# Patient Record
Sex: FEMALE | Race: WHITE | Employment: OTHER | URBAN - METROPOLITAN AREA
[De-identification: names, ages, dates, MRNs, and addresses within clinical notes are randomized per-mention and may not be internally consistent; named-entity substitution may affect disease eponyms.]

---

## 2022-01-07 ENCOUNTER — NURSE NAVIGATOR (OUTPATIENT)
Dept: CASE MANAGEMENT | Age: 67
End: 2022-01-07

## 2022-01-07 DIAGNOSIS — C50.912 MALIGNANT NEOPLASM OF LEFT FEMALE BREAST, UNSPECIFIED ESTROGEN RECEPTOR STATUS, UNSPECIFIED SITE OF BREAST (HCC): Primary | ICD-10-CM

## 2022-01-07 NOTE — PROGRESS NOTES
[address]  Breast Navigator Encounter    Name:    Vilma Miller  Age:    78 y.o. Diagnosis:   LEFT breast cancer    Interdisciplinary Team:  Med-Onc:    Surg-Onc:    Dr. Ria Knight:    Plastics:    :     Nurse Navigator:  Ade Turk RN, BSN, [facility]      Encounter type:  []Patient Initiated  []Navigator Follow-up []Pre-op  []Post-op  []Check-in Prior to First Treatment []Treatment Modality Change  []Survivorship Transition [x]Other:  Initial Navigator Encounter    Narrative:     Called patient for initial navigator contact. I explained what happens at the first consultation - an exam by the physician, a possible ultrasound, then complete discussion of surgical options and other treatment that may be considered. I encouraged the patient to bring  someone with her to this appointment since there is a lot of information that will be covered. She will be bringing her . Discussed breast MRI as the next step in the surgical work-up, and she is willing to go ahead and get this scheduled prior to her appt next week. With the assistance of the imaging navigator, I was able to get the patient set up for her breast MRI on 1/10/2022 at 1:45 pm.  I asked her to take her envelope from [address] with her to the appointment so they can load the images for comparison. Went over the location for the [place]. She lives very close to [facility], and will plan to make most of her appointments there after the initial consult. Provided the patient with my contact information and discussed my role in her care. Will continue to follow patient throughout  the care continuum.               Ade Turk RN, BSN, [facility]  Oncology Breast Navigator     [address]  [address] [place] [address] 22.  W: [phone]  F: [phone]  [email or web address]  Good Help to Those in [facility]

## 2022-01-07 NOTE — PROGRESS NOTES
Order placed for BILATERAL breast MRI with and without contrast per verbal order of Dr. Jesus Tovar. She is scheduled for 1/10/2022.

## 2022-01-10 ENCOUNTER — HOSPITAL ENCOUNTER (OUTPATIENT)
Dept: MRI IMAGING | Age: 67
Discharge: HOME | End: 2022-01-10
Attending: SURGERY
Payer: COMMERCIAL

## 2022-01-10 ENCOUNTER — NURSE NAVIGATOR (OUTPATIENT)
Dept: CASE MANAGEMENT | Age: 67
End: 2022-01-10

## 2022-01-10 DIAGNOSIS — C50.912 MALIGNANT NEOPLASM OF LEFT FEMALE BREAST, UNSPECIFIED ESTROGEN RECEPTOR STATUS, UNSPECIFIED SITE OF BREAST (HCC): ICD-10-CM

## 2022-01-10 PROCEDURE — 74011250636 HC RX REV CODE- 250/636

## 2022-01-10 PROCEDURE — 74011000250 HC RX REV CODE- 250: Performed by: SURGERY

## 2022-01-10 PROCEDURE — C9087 MRI BREAST BI W WO CONT: HCPCS

## 2022-01-10 PROCEDURE — 77049 MRI BREAST C-+ W/CAD BI: CPT

## 2022-01-10 PROCEDURE — 74011000258 HC RX REV CODE- 258: Performed by: SURGERY

## 2022-01-10 PROCEDURE — A9577 INJ MULTIHANCE: HCPCS

## 2022-01-10 RX ORDER — SODIUM CHLORIDE 0.9 % (FLUSH) 0.9 %
10 SYRINGE (ML) INJECTION
Status: COMPLETED | OUTPATIENT
Start: 2022-01-10 | End: 2022-01-10

## 2022-01-10 RX ADMIN — SODIUM CHLORIDE, PRESERVATIVE FREE 10 ML: 5 INJECTION INTRAVENOUS at 15:02

## 2022-01-10 RX ADMIN — SODIUM CHLORIDE 100 ML: 900 INJECTION, SOLUTION INTRAVENOUS at 15:02

## 2022-01-10 RX ADMIN — GADOBENATE DIMEGLUMINE 15 ML: 529 INJECTION, SOLUTION INTRAVENOUS at 15:00

## 2022-01-10 NOTE — PROGRESS NOTES
[address]  Breast Navigator Encounter    Name:    Carmella Allan  Age:    78 y.o. Diagnosis:   LEFT breast cancer    Interdisciplinary Team:  Med-Onc:    Surg-Onc:    Dr. Barron Nearing:    Plastics:    :     Nurse Navigator:  Flako Sena RN, BSN, [facility]      Encounter type:  [x]Patient Initiated  []Navigator Follow-up []Pre-op  []Post-op  []Check-in Prior to First Treatment []Treatment Modality Change  []Survivorship Transition []Other:       Narrative:    Had questions about forms that she is completing for her upcoming visit which I answered. Had questions about her meds - should she write down her OTCs? That is all she takes. I told her that she should. She has her MRI this afternoon and will take her disc with her when she goes. ADDENDUM:  Called after her MRI to say that she left her disc at the imaging center. I told her that this was fine; she did not need to go back to pick it up.         Flako Sena RN, BSN, [facility]  Oncology Breast Navigator     [address]  [address] [place] [address] Anahi Thacker  22.  W: [phone]  F: [phone]  [email or web address]  Good Help to Those in [facility]

## 2022-01-12 ENCOUNTER — OFFICE VISIT (OUTPATIENT)
Dept: SURGERY | Age: 67
End: 2022-01-12
Payer: COMMERCIAL

## 2022-01-12 ENCOUNTER — TRANSCRIBE ORDER (OUTPATIENT)
Dept: REGISTRATION | Age: 67
End: 2022-01-12

## 2022-01-12 VITALS
WEIGHT: 160 LBS | SYSTOLIC BLOOD PRESSURE: 158 MMHG | HEART RATE: 82 BPM | DIASTOLIC BLOOD PRESSURE: 87 MMHG | BODY MASS INDEX: 28.35 KG/M2 | HEIGHT: 63 IN

## 2022-01-12 DIAGNOSIS — Z01.812 PRE-PROCEDURE LAB EXAM: Primary | ICD-10-CM

## 2022-01-12 DIAGNOSIS — Z17.0 MALIGNANT NEOPLASM OF UPPER-OUTER QUADRANT OF LEFT BREAST IN FEMALE, ESTROGEN RECEPTOR POSITIVE (HCC): Primary | ICD-10-CM

## 2022-01-12 DIAGNOSIS — C50.412 MALIGNANT NEOPLASM OF UPPER-OUTER QUADRANT OF LEFT BREAST IN FEMALE, ESTROGEN RECEPTOR POSITIVE (HCC): Primary | ICD-10-CM

## 2022-01-12 PROCEDURE — 99205 OFFICE O/P NEW HI 60 MIN: CPT | Performed by: SURGERY

## 2022-01-12 PROCEDURE — 76642 ULTRASOUND BREAST LIMITED: CPT | Performed by: SURGERY

## 2022-01-12 RX ORDER — MINERAL OIL
ENEMA (ML) RECTAL DAILY
COMMUNITY

## 2022-01-12 RX ORDER — BENZOCAINE .13; .15; .5; 2 G/100G; G/100G; G/100G; G/100G
2 GEL ORAL DAILY
COMMUNITY

## 2022-01-12 RX ORDER — FAMOTIDINE 10 MG/1
10 TABLET ORAL EVERY OTHER DAY
COMMUNITY

## 2022-01-12 NOTE — PROGRESS NOTES
HISTORY OF PRESENT ILLNESS  Steve Vaughn is a 78 y.o. female. HPI NEW patient consult referred by Dr. Kisha Shah for LEFT breast IDC. Mass was found by annual screening mammogram. Patient cannot palpate any lumps. She is not having any pain. Patient denies any skin or nipple changes. 21- US guided biopsy of LEFT breast, LEFT IDC, grd 2, ER %, FL %, HER2 equivocal, FISH pending, ki-67 20%    - History of right breast biopsy, benign    Family History: Brother, dx at 70, colon cancer,       Mammogram, 21, BIRADS 4C, 2.5 cm irregular mass with indistinct margins at 1:00    MRI Results (most recent):  Results from Hospital Encounter encounter on 01/10/22    MRI BREAST BI W WO CONT    Narrative  HISTORY:  69-year-old female with newly diagnosed invasive ductal carcinoma in  the left breast at 1:00. EXAM: BILATERAL BREAST MRI WITH AND WITHOUT CONTRAST. CONTRAST: 15 mL MultiHance. ANESTHESIA: None    PROCEDURE: Bilateral high resolution dynamic and morphologic enhanced breast MRI  was performed using a dedicated breast coil in the prone position. Pre- and  postcontrast axial and sagittal T1 and STIR images were performed. These were  post-processed using CAD kinetic analysis, digital subtraction, enhancement  curves and 2D and 3D reconstructions. COMPARISON:  Recent mammography and ultrasonography. Celestina Seth FINDINGS: Background parenchyma is mild to moderate. Background parenchymal  enhancement is mild. Left breast: At approximately 2:00, posterior depth, mass enhancement, with  significant dynamic enhancement and washout kinetics, measures 4.4 cm TRV, 3.1  cm AP and 2.3 cm CC. It contains a biopsy clip artifact centrally. It is  accompanied by a nearby dynamically enhancing oval mass measuring 1.2 x 0.7 x  0.7 cm, immediately caudad in the posterior nipple line.  Since it lies  approximately 3 mm inferomedial to the dominant index mass, combined  measurements are as follows: 4.4 cm TRV, 3.1 cm AP and 2.8 cm CC. A left  axillary lymph node is irregular in configuration with asymmetric cortical  thickening, measuring 2.5 x 1.5 x 3.4 cm. Right breast: No dominant abnormality of morphology or enhancement. No  lymphadenopathy. Impression  1. Left BI-RADS 6, known malignancy. Right BI-RADS 2, benign. 2. LEFT BREAST: Known invasive ductal carcinoma at approximately 2:00 posterior  depth, with a daughter nodule 3 mm away, and a combined measurement of 4.4 x 3.1  x 2.8 cm. Indeterminate left axillary lymph node. Correlate with clinical  examination, ultrasonography and consider sampling if appropriate. No  multicentric disease. Surgical treatment is planned. 3. RIGHT BREAST: No MRI sign of malignancy. No lymphadenopathy. 4. A summary portfolio has been created for reference and is available in PACS. A negative breast MRI examination has high sensitivity and moderate specificity,  and speaks strongly against invasive cancer down to a detection threshold of  4-5 mm, but may not detect some lower grade or in situ carcinomas. Therefore,  MRI should not be used to avoid an o/w indicated biopsy. Due to the prone  positioning for this exam, the described location of MR findings may differ from  other studies. Routine clinical and mammographic followup are recommended. When  available, pathology followup is appreciated. History reviewed. No pertinent past medical history. History reviewed. No pertinent surgical history.   Social History     Socioeconomic History    Marital status:      Spouse name: Not on file    Number of children: Not on file    Years of education: Not on file    Highest education level: Not on file   Occupational History    Not on file   Tobacco Use    Smoking status: Former Smoker     Quit date:      Years since quittin.0    Smokeless tobacco: Never Used   Substance and Sexual Activity    Alcohol use: Yes     Comment: nightly    Drug use: Never    Sexual activity: Not on file   Other Topics Concern    Not on file   Social History Narrative    Not on file     Social Determinants of Health     Financial Resource Strain:     Difficulty of Paying Living Expenses: Not on file   Food Insecurity:     Worried About Running Out of Food in the Last Year: Not on file    Gianna of Food in the Last Year: Not on file   Transportation Needs:     Lack of Transportation (Medical): Not on file    Lack of Transportation (Non-Medical): Not on file   Physical Activity:     Days of Exercise per Week: Not on file    Minutes of Exercise per Session: Not on file   Stress:     Feeling of Stress : Not on file   Social Connections:     Frequency of Communication with Friends and Family: Not on file    Frequency of Social Gatherings with Friends and Family: Not on file    Attends [employer] Services: Not on file    Active Member of [address] [place] [address] [employer] or Organizations: Not on file    Attends Club or Organization Meetings: Not on file    Marital Status: Not on file   Intimate Partner Violence:     Fear of Current or Ex-Partner: Not on file    Emotionally Abused: Not on file    Physically Abused: Not on file    Sexually Abused: Not on file   Housing Stability:     Unable to Pay for Housing in the Last Year: Not on file    Number of [place] in the Last Year: Not on file    Unstable Housing in the Last Year: Not on file     OB History    No obstetric history on file. Obstetric Comments   Menarche 14, LMP 53, # of children 2, age of 4st delivery 31, Hysterectomy/oophorectomy No/No, Breast bx Yes, history of breast feeding Yes, BCP Yes, Hormone therapy Yes             Current Outpatient Medications:     glucosam/chond-msm1/C/gill/bor (GLUCOSAMINE-CHOND-MSM COMPLEX PO), Glucosamine, Disp: , Rfl:     omega 3-dha-epa-fish oil 1,000 mg (250 mg-750 mg)/5 mL liqd, 1 capsule, Disp: , Rfl:     famotidine (PEPCID) 10 mg tablet, Take 10 mg by mouth every other day. , Disp: , Rfl:     budesonide (RHINOCORT AQUA) 32 mcg/actuation nasal spray, , Disp: , Rfl:     fexofenadine (ALLEGRA) 180 mg tablet, Take  by mouth daily. , Disp: , Rfl:   Allergies   Allergen Reactions    Latex Rash    Sulfa (Sulfonamide Antibiotics) Rash       Review of Systems   All other systems reviewed and are negative. Physical Exam  Vitals and nursing note reviewed. Chest:   Breasts: Breasts are symmetrical.      Right: No inverted nipple, mass, nipple discharge, skin change, tenderness, axillary adenopathy or supraclavicular adenopathy. Left: Mass present. No inverted nipple, nipple discharge, skin change, tenderness, axillary adenopathy or supraclavicular adenopathy. Comments: 3 cm mass left breast uoq   Lymphadenopathy:      Cervical: No cervical adenopathy. Upper Body:      Right upper body: No supraclavicular, axillary or pectoral adenopathy. Left upper body: No supraclavicular, axillary or pectoral adenopathy. BREAST ULTRASOUND, Preop planning  Indication:preop planning  left Breast 2:00 and left axilla   Technique: The area was scanned using a high-frequency linear-array near-field transducer  Findings: 3 cm  irregular mass with dropout, normal appearing axillary nodes of note, the enlarged node on mri has normal cortex and shape. Impression: Biopsy site visible with ultrasound  Disposition:  Will schedule lumpectomy with sentinel lymph node biopsy    ASSESSMENT and PLAN    ICD-10-CM ICD-9-CM    1. Malignant neoplasm of upper-outer quadrant of left breast in female, estrogen receptor positive ([facility] [address]  C50.412 174.4     Z17.0 V86.0    LEFT IDC, grd 2, ER %, MS %, HER2 equivocal, FISH pending, ki-67 1-10%  T2 N0  Axillary nodes normal on ultrasounds  I have reviewed the imaging and pathology with her and she was given copies of these reports.     90 minutes were spent face-to-face with the patient during this encounter and 90% of that time was spent on counseling and coordination of care. 1. Discussed lumpectomy and radiation vs mastectomy. Discussed reconstruction. 2. Discussed sentinel lymph node biopsy. 3. Discussed external beam radiation. 4. Discussed hormone therapy. 5. Discussed the possibility of chemotherapy. Will send mammaprint. She wants a left us guided lumpectomy, sln biopsy   Risks include bleeding, bruising, scar, infection, need for more surgery and lymphedema  Will schedule.

## 2022-01-12 NOTE — H&P (VIEW-ONLY)
HISTORY OF PRESENT ILLNESS  Omar Guardado is a 78 y.o. female. HPI NEW patient consult referred by Dr. Miguelangel Clifton for LEFT breast IDC. Mass was found by annual screening mammogram. Patient cannot palpate any lumps. She is not having any pain. Patient denies any skin or nipple changes. 21- US guided biopsy of LEFT breast, LEFT IDC, grd 2, ER %, [place] %, HER2 equivocal, FISH pending, ki-67 20%    - History of right breast biopsy, benign    Family History: Brother, dx at 70, colon cancer,       Mammogram, 21, BIRADS 4C, 2.5 cm irregular mass with indistinct margins at 1:00    MRI Results (most recent):  Results from Hospital Encounter encounter on 01/10/22    MRI BREAST BI W WO CONT    Narrative  HISTORY:  57-year-old female with newly diagnosed invasive ductal carcinoma in  the left breast at 1:00. EXAM: BILATERAL BREAST MRI WITH AND WITHOUT CONTRAST. CONTRAST: 15 mL MultiHance. ANESTHESIA: None    PROCEDURE: Bilateral high resolution dynamic and morphologic enhanced breast MRI  was performed using a dedicated breast coil in the prone position. Pre- and  postcontrast axial and sagittal T1 and STIR images were performed. These were  post-processed using CAD kinetic analysis, digital subtraction, enhancement  curves and 2D and 3D reconstructions. COMPARISON:  Recent mammography and ultrasonography. Brielle Krishnamurthy FINDINGS: Background parenchyma is mild to moderate. Background parenchymal  enhancement is mild. Left breast: At approximately 2:00, posterior depth, mass enhancement, with  significant dynamic enhancement and washout kinetics, measures 4.4 cm TRV, 3.1  cm AP and 2.3 cm CC. It contains a biopsy clip artifact centrally. It is  accompanied by a nearby dynamically enhancing oval mass measuring 1.2 x 0.7 x  0.7 cm, immediately caudad in the posterior nipple line.  Since it lies  approximately 3 mm inferomedial to the dominant index mass, combined  measurements are as follows: 4.4 cm TRV, 3.1 cm AP and 2.8 cm CC. A left  axillary lymph node is irregular in configuration with asymmetric cortical  thickening, measuring 2.5 x 1.5 x 3.4 cm. Right breast: No dominant abnormality of morphology or enhancement. No  lymphadenopathy. Impression  1. Left BI-RADS 6, known malignancy. Right BI-RADS 2, benign. 2. LEFT BREAST: Known invasive ductal carcinoma at approximately 2:00 posterior  depth, with a daughter nodule 3 mm away, and a combined measurement of 4.4 x 3.1  x 2.8 cm. Indeterminate left axillary lymph node. Correlate with clinical  examination, ultrasonography and consider sampling if appropriate. No  multicentric disease. Surgical treatment is planned. 3. RIGHT BREAST: No MRI sign of malignancy. No lymphadenopathy. 4. A summary portfolio has been created for reference and is available in PACS. A negative breast MRI examination has high sensitivity and moderate specificity,  and speaks strongly against invasive cancer down to a detection threshold of  4-5 mm, but may not detect some lower grade or in situ carcinomas. Therefore,  MRI should not be used to avoid an o/w indicated biopsy. Due to the prone  positioning for this exam, the described location of MR findings may differ from  other studies. Routine clinical and mammographic followup are recommended. When  available, pathology followup is appreciated. History reviewed. No pertinent past medical history. History reviewed. No pertinent surgical history.   Social History     Socioeconomic History    Marital status:      Spouse name: Not on file    Number of children: Not on file    Years of education: Not on file    Highest education level: Not on file   Occupational History    Not on file   Tobacco Use    Smoking status: Former Smoker     Quit date:      Years since quittin.0    Smokeless tobacco: Never Used   Substance and Sexual Activity    Alcohol use: Yes     Comment: nightly    Drug use: Never    Sexual activity: Not on file   Other Topics Concern    Not on file   Social History Narrative    Not on file     Social Determinants of Health     Financial Resource Strain:     Difficulty of Paying Living Expenses: Not on file   Food Insecurity:     Worried About Running Out of Food in the Last Year: Not on file    Gianna of Food in the Last Year: Not on file   Transportation Needs:     Lack of Transportation (Medical): Not on file    Lack of Transportation (Non-Medical): Not on file   Physical Activity:     Days of Exercise per Week: Not on file    Minutes of Exercise per Session: Not on file   Stress:     Feeling of Stress : Not on file   Social Connections:     Frequency of Communication with Friends and Family: Not on file    Frequency of Social Gatherings with Friends and Family: Not on file    Attends [employer] Services: Not on file    Active Member of [address] [place] [address] [employer] or Organizations: Not on file    Attends Club or Organization Meetings: Not on file    Marital Status: Not on file   Intimate Partner Violence:     Fear of Current or Ex-Partner: Not on file    Emotionally Abused: Not on file    Physically Abused: Not on file    Sexually Abused: Not on file   Housing Stability:     Unable to Pay for Housing in the Last Year: Not on file    Number of [place] in the Last Year: Not on file    Unstable Housing in the Last Year: Not on file     OB History    No obstetric history on file. Obstetric Comments   Menarche 14, LMP 53, # of children 2, age of 4st delivery 31, Hysterectomy/oophorectomy No/No, Breast bx Yes, history of breast feeding Yes, BCP Yes, Hormone therapy Yes             Current Outpatient Medications:     glucosam/chond-msm1/C/gill/bor (GLUCOSAMINE-CHOND-MSM COMPLEX PO), Glucosamine, Disp: , Rfl:     omega 3-dha-epa-fish oil 1,000 mg (250 mg-750 mg)/5 mL liqd, 1 capsule, Disp: , Rfl:     famotidine (PEPCID) 10 mg tablet, Take 10 mg by mouth every other day. , Disp: , Rfl:     budesonide (RHINOCORT AQUA) 32 mcg/actuation nasal spray, , Disp: , Rfl:     fexofenadine (ALLEGRA) 180 mg tablet, Take  by mouth daily. , Disp: , Rfl:   Allergies   Allergen Reactions    Latex Rash    Sulfa (Sulfonamide Antibiotics) Rash       Review of Systems   All other systems reviewed and are negative. Physical Exam  Vitals and nursing note reviewed. Chest:   Breasts: Breasts are symmetrical.      Right: No inverted nipple, mass, nipple discharge, skin change, tenderness, axillary adenopathy or supraclavicular adenopathy. Left: Mass present. No inverted nipple, nipple discharge, skin change, tenderness, axillary adenopathy or supraclavicular adenopathy. Comments: 3 cm mass left breast uoq   Lymphadenopathy:      Cervical: No cervical adenopathy. Upper Body:      Right upper body: No supraclavicular, axillary or pectoral adenopathy. Left upper body: No supraclavicular, axillary or pectoral adenopathy. BREAST ULTRASOUND, Preop planning  Indication:preop planning  left Breast 2:00 and left axilla   Technique: The area was scanned using a high-frequency linear-array near-field transducer  Findings: 3 cm  irregular mass with dropout, normal appearing axillary nodes of note, the enlarged node on mri has normal cortex and shape. Impression: Biopsy site visible with ultrasound  Disposition:  Will schedule lumpectomy with sentinel lymph node biopsy    ASSESSMENT and PLAN    ICD-10-CM ICD-9-CM    1. Malignant neoplasm of upper-outer quadrant of left breast in female, estrogen receptor positive ([facility] [address]  C50.412 174.4     Z17.0 V86.0    LEFT IDC, grd 2, ER %, [place] %, HER2 equivocal, FISH pending, ki-67 1-10%  T2 N0  Axillary nodes normal on ultrasounds  I have reviewed the imaging and pathology with her and she was given copies of these reports.     90 minutes were spent face-to-face with the patient during this encounter and 90% of that time was spent on counseling and coordination of care. 1. Discussed lumpectomy and radiation vs mastectomy. Discussed reconstruction. 2. Discussed sentinel lymph node biopsy. 3. Discussed external beam radiation. 4. Discussed hormone therapy. 5. Discussed the possibility of chemotherapy. Will send mammaprint. She wants a left us guided lumpectomy, sln biopsy   Risks include bleeding, bruising, scar, infection, need for more surgery and lymphedema  Will schedule.

## 2022-01-13 ENCOUNTER — DOCUMENTATION ONLY (OUTPATIENT)
Dept: SURGERY | Age: 67
End: 2022-01-13

## 2022-01-13 NOTE — PROGRESS NOTES
Patient called requesting a letter to cancer her trip because of upcoming surgery. Please email letter to [email or web address]

## 2022-01-13 NOTE — PROGRESS NOTES
The patient called requesting a letter be written to excuse her from her trip the end of January. The patient is scheduled for a lumpectomy at the end of January. The letter was written and its in the chart.  I am waiting for the patient to call back with an email address to send it to?

## 2022-01-14 ENCOUNTER — DOCUMENTATION ONLY (OUTPATIENT)
Dept: CASE MANAGEMENT | Age: 67
End: 2022-01-14

## 2022-01-14 ENCOUNTER — NURSE NAVIGATOR (OUTPATIENT)
Dept: CASE MANAGEMENT | Age: 67
End: 2022-01-14

## 2022-01-14 NOTE — PROGRESS NOTES
[address]  Breast Navigator Encounter    Name:    Rina Carl  Age:    78 y.o. Diagnosis:    LEFT breast cancer    Interdisciplinary Team:  Med-Onc:    Surg-Onc:    Dr. Rian Jimenez:    Plastics:    :     Nurse Navigator:  Tanis Severs, RN, BSN, [facility]      Encounter type:  [x]Patient Initiated  []Navigator Follow-up []Pre-op  []Post-op  []Check-in Prior to First Treatment []Treatment Modality Change  []Survivorship Transition []Other:       Narrative:    Returned patient's call. She has been called by [facility] to get set up for PAT and COVID testing. She was not called yet by Dr. Robert Mckenzie surgical scheduler, so wanted to make sure that this was the correct information. I confirmed that she was scheduled for 1/25/2022 at [facility]. She will return the call to [facility] and get set up. We discussed sentinel node biopsy, how many people can come with her to surgery (1). She thinks that she remembers Dr. Ysabel Cotton saying that she will do a frozen section at the time of surgery. I will remind Dr. Ysabel Cotton of this. I told her that she was scheduled currently at 9:45 on the 25th, so SNBX will be approximately 2 hrs prior to this.           Tanis Severs, RN, BSN, [facility]  Oncology Breast Navigator     [address]  [address] [facility].  W: [phone]  F: [phone]  [email or web address]  Good Help to Those in [facility]

## 2022-01-14 NOTE — PROGRESS NOTES
Called [facility]. FISH not back yet. [facility] will check on this again this afternoon.

## 2022-01-18 DIAGNOSIS — C50.912 MALIGNANT NEOPLASM OF LEFT FEMALE BREAST, UNSPECIFIED ESTROGEN RECEPTOR STATUS, UNSPECIFIED SITE OF BREAST (HCC): Primary | ICD-10-CM

## 2022-01-18 NOTE — PROGRESS NOTES
I called and spoke with the patient about her upcoming surgery. We went over her morning of surgery time line, arriving there at 7:00 AM.  Having her SNBX AT 8:00 AM in nuclear medicine and surgery at 9:30 . She asked about a medication list she gave the nurse at her last visit. She would like a copy of this. She appreciated the phone call.

## 2022-01-20 ENCOUNTER — HOSPITAL ENCOUNTER (OUTPATIENT)
Dept: PREADMISSION TESTING | Age: 67
Discharge: HOME | End: 2022-01-20
Attending: SURGERY
Payer: COMMERCIAL

## 2022-01-20 ENCOUNTER — HOSPITAL ENCOUNTER (OUTPATIENT)
Dept: PREADMISSION TESTING | Age: 67
Discharge: HOME | End: 2022-01-20
Payer: COMMERCIAL

## 2022-01-20 VITALS
BODY MASS INDEX: 29.38 KG/M2 | WEIGHT: 165.79 LBS | SYSTOLIC BLOOD PRESSURE: 122 MMHG | DIASTOLIC BLOOD PRESSURE: 80 MMHG | OXYGEN SATURATION: 97 % | HEIGHT: 63 IN | HEART RATE: 77 BPM | TEMPERATURE: 98.3 F

## 2022-01-20 DIAGNOSIS — Z01.812 PRE-PROCEDURE LAB EXAM: ICD-10-CM

## 2022-01-20 LAB
BASOPHILS # BLD: 0 K/UL (ref 0–0.1)
BASOPHILS NFR BLD: 0 % (ref 0–1)
DIFFERENTIAL METHOD BLD: NORMAL
EOSINOPHIL # BLD: 0.1 K/UL (ref 0–0.4)
EOSINOPHIL NFR BLD: 2 % (ref 0–7)
ERYTHROCYTE [DISTWIDTH] IN BLOOD BY AUTOMATED COUNT: 12.3 % (ref 11.5–14.5)
HCT VFR BLD AUTO: 41.4 % (ref 35–47)
HGB BLD-MCNC: 13.9 G/DL (ref 11.5–16)
IMM GRANULOCYTES # BLD AUTO: 0 K/UL (ref 0–0.04)
IMM GRANULOCYTES NFR BLD AUTO: 0 % (ref 0–0.5)
LYMPHOCYTES # BLD: 3.3 K/UL (ref 0.8–3.5)
LYMPHOCYTES NFR BLD: 43 % (ref 12–49)
MCH RBC QN AUTO: 32 PG (ref 26–34)
MCHC RBC AUTO-ENTMCNC: 33.6 G/DL (ref 30–36.5)
MCV RBC AUTO: 95.4 FL (ref 80–99)
MONOCYTES # BLD: 0.6 K/UL (ref 0–1)
MONOCYTES NFR BLD: 9 % (ref 5–13)
NEUTS SEG # BLD: 3.5 K/UL (ref 1.8–8)
NEUTS SEG NFR BLD: 46 % (ref 32–75)
NRBC # BLD: 0 K/UL (ref 0–0.01)
NRBC BLD-RTO: 0 PER 100 WBC
PLATELET # BLD AUTO: 217 K/UL (ref 150–400)
PMV BLD AUTO: 10.6 FL (ref 8.9–12.9)
RBC # BLD AUTO: 4.34 M/UL (ref 3.8–5.2)
WBC # BLD AUTO: 7.6 K/UL (ref 3.6–11)

## 2022-01-20 PROCEDURE — 36415 COLL VENOUS BLD VENIPUNCTURE: CPT

## 2022-01-20 PROCEDURE — 85025 COMPLETE CBC W/AUTO DIFF WBC: CPT

## 2022-01-20 PROCEDURE — U0005 INFEC AGEN DETEC AMPLI PROBE: HCPCS

## 2022-01-20 RX ORDER — LORAZEPAM 1 MG/1
1 TABLET ORAL AS NEEDED
COMMUNITY

## 2022-01-20 NOTE — PERIOP NOTES
[address] INSTRUCTIONS    Surgery Date:   1/25/22    [facility] staff will call you between 4 PM- 8 PM the day before surgery with your arrival time. If your surgery is on a Monday, we will call you the preceding Friday. Please call [phone] after 8 PM if you did not receive your arrival time. 1. Please report to [facility] Patient Access/Admitting on the 1st floor. Bring your insurance card, photo identification, and any copayment ( if applicable). 2. If you are going home the same day of your surgery, you must have a responsible adult to drive you home. You need to have a responsible adult to stay with you the first 24 hours after surgery and you should not drive a car for 24 hours following your surgery. 3. Do NOT eat any solid foods after midnight the night before surgery including candy, mint or gum. You may drink clear liquids from midnight until 1 hour prior to your arrival. You may drink up to 12 ounces at one time every 4 hours. Please note special instructions, if applicable, below for medications. 4. Do NOT drink alcohol or smoke 24 hours before surgery. STOP smoking for 14 days prior as it helps with breathing and healing after surgery. 5. If you are being admitted to the hospital, please leave personal belongings/luggage in your car until you have an assigned hospital room number. 6. Please wear comfortable clothes. Wear your glasses instead of contacts. We ask that all money, jewelry and valuables be left at home. Wear no make up, particularly mascara, the day of surgery. 7.  All body piercings, rings, and jewelry need to be removed and left at home. Please remove any nail polish or artifical nails from your fingernails. Please wear your hair loose or down. Please no pony-tails, buns, or any metal hair accessories. If you shower the morning of surgery, please do not apply any lotions or powders afterwards. You may wear deodorant, unless having breast surgery.   Do not shave any body area within 24 hours of your surgery. 8. Please follow all instructions to avoid any potential surgical cancellation. 9. Should your physical condition change, (i.e. fever, cold, flu, etc.) please notify your surgeon as soon as possible. 10. It is important to be on time. If a situation occurs where you may be delayed, please call:  [phone] / [phone] on the day of surgery. 11. The Preadmission Testing staff can be reached at [phone]. 12. Special instructions: NONE      Current Outpatient Medications   Medication Sig    OTHER Bone Builder DS 3 tabs daily    OTHER Brain Support DS 1 daily    OTHER Phytonutrient Multi 2 tabs daily    LORazepam (ATIVAN) 1 mg tablet Take 1 mg by mouth as needed for Anxiety.  glucosam/chond-msm1/C/gill/bor (GLUCOSAMINE-CHOND-MSM COMPLEX PO) 2 tabs daily    omega 3-dha-epa-fish oil 1,000 mg (250 mg-750 mg)/5 mL liqd 1 capsule    famotidine (PEPCID) 10 mg tablet Take 10 mg by mouth every other day.  budesonide (RHINOCORT AQUA) 32 mcg/actuation nasal spray 2 Sprays by Right Nostril route daily.  fexofenadine (ALLEGRA) 180 mg tablet Take  by mouth daily. No current facility-administered medications for this encounter. 1. YOU MUST ONLY TAKE THESE MEDICATIONS THE MORNING OF SURGERY WITH A SIP OF WATER: ALLEGRA  2. MEDICATIONS TO TAKE THE MORNING OF SURGERY ONLY IF NEEDED: LORAZEPAM  3. HOLD these medications BEFORE Surgery: NONE  4. Ask your surgeon/prescribing physician about when/if to STOP taking these medications: NONE  5. Stop all vitamins, herbal medicines and Aspirin containing products 7 days prior to surgery. Stop any non-steroidal anti-inflammatory drugs (i.e. Ibuprofen, Naproxen, Advil, Aleve) 3 days before surgery. You may take Tylenol. 6. If you are currently taking Plavix, Coumadin,or any other blood-thinning/anticoagulant medication contact your prescribing physician for instructions.     Eating and Drinking Before Surgery     You may eat a regular dinner at the usual time on the day before your surgery.  Do NOT eat any solid foods after midnight unless your arrival time at the hospital is 3pm or later.  You may drink clear liquids only from 12 midnight until 1 hours prior to your arrival time at the hospital on the day of your surgery. Do NOT drink alcohol.  Clear liquids include:  o Water  o Fruit juices without pulp( i.e. apple juice)  o Carbonated beverages  o Black coffee (no cream/milk)  o Tea (no cream/milk)  o Gatorade   You may drink up to 12-16 ounces at one time every 4 hours between the hours of midnight and 1 hour before your arrival time at the hospital. Example- if your arrival time at the hospital is 6am, you may drink 12-16 ounces of clear liquids no later than 5am.   If your arrival time at the hospital is 3pm or later, you may eat a light breakfast before 8am.   A light breakfast includes:  o Toast or bagel (no butter)  o Black coffee (no cream/milk)  o Tea (no cream/milk)  o Fruit juices without pulp ( i.e. apple juice)  o Do NOT eat meat, eggs, vegetables or fruit   If you have any questions, please contact your surgeon's office. Preventing Infections Before and After - Your Surgery    IMPORTANT INSTRUCTIONS    Please read and follow these instructions carefully. If you are unable to comply with the below instructions your procedure will be cancelled. You play an important role in your health and preparation for surgery. To reduce the germs on your skin you will need to shower with CHG soap (Chorhexidine gluconate 4%) two times before surgery. CHG soap (Hibiclens, Hex-A-Clens or store brand)   CHG soap will be provided at your Preadmission Testing (PAT) appointment.  If you do not have a PAT appointment before surgery, you may arrange to  CHG soap from our office or purchase CHG soap at a pharmacy, grocery or department store.    You need to purchase TWO 4 ounce bottles to use for your 2 showers. Steps to follow:  1. Wash your hair with your normal shampoo and your body with regular soap and rinse well to remove shampoo and soap from your skin. 2. Wet a clean washcloth and turn off the shower. 3. Put CHG soap on washcloth and apply to your entire body from the neck down. Do not use on your head, face or private parts(genitals). Do not use CHG soap on open sores, wounds or areas of skin irritation. 4. Wash you body gently for 5 minutes. Do not wash your skin too hard. This soap does not create lather. Pay special attention to your underarms and from your belly button to your feet. 5. Turn the shower back on and rinse well to get CHG soap off your body. 6. Pat your skin dry with a clean, dry towel. Do not apply lotions or moisturizer. 7. Put on clean clothes and sleep on fresh bed sheets and do not allow pets to sleep with you. Shower with CHG soap 2 times before your surgery   The evening before your surgery   The morning of your surgery      Tips to help prevent infections after your surgery:  1. Protect your surgical wound from germs:  ? Hand washing is the most important thing you and your caregivers can do to prevent infections. ? Keep your bandage clean and dry! ? Do not touch your surgical wound. 2. Use clean, freshly washed towels and washcloths every time you shower; do not share bath linens with others. 3. Until your surgical wound is healed, wear clothing and sleep on bed linens each day that are clean and freshly washed. 4. Do not allow pets to sleep in your bed with you or touch your surgical wound. 5. Do not smoke - smoking delays wound healing. This may be a good time to stop smoking. 6. If you have diabetes, it is important for you to manage your blood sugar levels properly before your surgery as well as after your surgery. Poorly managed blood sugar levels slow down wound healing and prevent you from healing completely.         [facility]   Instructions for Pre-Surgery COVID-19 Testing     Across our [facility] we have established standard guidelines to ensure the health and safety of our patients, residents and associates as we resume elective services for patients. All patients presenting for surgery are required to have a COVID-19 test result within 96 hours of their scheduled surgery. [facility] is providing this test free of charge to the patient. Instructions for COVID-19 Testing:     Patients will receive a call from Pre-Admission Testing 4-5 days prior to surgery to schedule a date and time to come to the [address] [place] [address] Drive for their COVID-19 test   Patients are advised to self-quarantine after testing until their scheduled surgery   Once on site, patients will be registered and receive COVID test in their vehicle   If a patient is scheduled for normal Pre Admission Testing 96 hours from date of surgery, the patient will still have their COVID test done at the [address] [place] [address] located at [address] [place] [address] Positive results will be shared with the surgeon and anesthesiologist and may result in cancellation of the elective procedure    Testing Hours and Location:   Address:  [address] [place] [address] Up Pre Admission 11 [facility] in the Discharge Lot on [facility] (Map Attached)  174 [facility], [address]   Hours: Monday- Friday 7a-3p    PAT Phone Number: [phone]              Patient Information Regarding COVID Restrictions    Patients are advised to self-quarantine after COVID testing up to the day of the scheduled procedure. Day of Procedure     Please park in the parking deck or any designated visitor parking lot.  Enter the facility through the Main Entrance of the hospital.   A temperature check and appropriate symptom/exposure screening will be done prior to entry to the facility.    On the day of surgery, please provide the cell phone number of the person who will be waiting for you to the Patient Access representative at the time of registration.  Please wear a mask on the day of your procedure.  We are now allowing one designated visitor per stay. Pediatric patients may have 2 designated visitors. This one person may come in with you on the day of your procedure.  No visitors under the age of 13.  The designated visitor must also wear a mask.  Once your procedure and the immediate recovery period is completed, a nurse in the recovery area will contact your designated visitor to inform them of your room number or to otherwise review other pertinent information regarding your care.  Social distancing practices are to be adhered to in waiting areas and the cafeteria. The patient was contacted  in person. She verbalized understanding of all instructions does not  need reinforcement.

## 2022-01-21 ENCOUNTER — DOCUMENTATION ONLY (OUTPATIENT)
Dept: CASE MANAGEMENT | Age: 67
End: 2022-01-21

## 2022-01-21 LAB
SARS-COV-2, XPLCVT: NOT DETECTED
SOURCE, COVRS: NORMAL

## 2022-01-21 NOTE — PROGRESS NOTES
Left message at [facility] regarding [address] results, which are still not back yet.

## 2022-01-24 ENCOUNTER — DOCUMENTATION ONLY (OUTPATIENT)
Dept: CASE MANAGEMENT | Age: 67
End: 2022-01-24

## 2022-01-24 ENCOUNTER — ANESTHESIA EVENT (OUTPATIENT)
Dept: MEDSURG UNIT | Age: 67
End: 2022-01-24
Payer: COMMERCIAL

## 2022-01-24 RX ORDER — SODIUM CHLORIDE, SODIUM LACTATE, POTASSIUM CHLORIDE, CALCIUM CHLORIDE 600; 310; 30; 20 MG/100ML; MG/100ML; MG/100ML; MG/100ML
100 INJECTION, SOLUTION INTRAVENOUS CONTINUOUS
Status: CANCELLED | OUTPATIENT
Start: 2022-01-24

## 2022-01-24 RX ORDER — HYDROMORPHONE HYDROCHLORIDE 1 MG/ML
0.2 INJECTION, SOLUTION INTRAMUSCULAR; INTRAVENOUS; SUBCUTANEOUS
Status: CANCELLED | OUTPATIENT
Start: 2022-01-24 | End: 2022-01-24

## 2022-01-24 RX ORDER — DIPHENHYDRAMINE HYDROCHLORIDE 50 MG/ML
12.5 INJECTION, SOLUTION INTRAMUSCULAR; INTRAVENOUS AS NEEDED
Status: CANCELLED | OUTPATIENT
Start: 2022-01-24 | End: 2022-01-24

## 2022-01-24 RX ORDER — MIDAZOLAM HYDROCHLORIDE 1 MG/ML
0.5 INJECTION, SOLUTION INTRAMUSCULAR; INTRAVENOUS
Status: CANCELLED | OUTPATIENT
Start: 2022-01-24

## 2022-01-24 RX ORDER — EPHEDRINE SULFATE/0.9% NACL/PF 50 MG/5 ML
5 SYRINGE (ML) INTRAVENOUS AS NEEDED
Status: CANCELLED | OUTPATIENT
Start: 2022-01-24

## 2022-01-24 RX ORDER — OXYCODONE HYDROCHLORIDE 5 MG/1
5 TABLET ORAL AS NEEDED
Status: CANCELLED | OUTPATIENT
Start: 2022-01-24

## 2022-01-24 RX ORDER — MORPHINE SULFATE 2 MG/ML
2 INJECTION, SOLUTION INTRAMUSCULAR; INTRAVENOUS
Status: CANCELLED | OUTPATIENT
Start: 2022-01-24

## 2022-01-24 RX ORDER — SODIUM CHLORIDE 9 MG/ML
25 INJECTION, SOLUTION INTRAVENOUS CONTINUOUS
Status: CANCELLED | OUTPATIENT
Start: 2022-01-24

## 2022-01-24 RX ORDER — ONDANSETRON 2 MG/ML
4 INJECTION INTRAMUSCULAR; INTRAVENOUS AS NEEDED
Status: CANCELLED | OUTPATIENT
Start: 2022-01-24

## 2022-01-24 RX ORDER — FENTANYL CITRATE 50 UG/ML
25 INJECTION, SOLUTION INTRAMUSCULAR; INTRAVENOUS
Status: CANCELLED | OUTPATIENT
Start: 2022-01-24

## 2022-01-24 NOTE — PROGRESS NOTES
Per verbal report of Marjan at [facility] was negative. This was faxed this morning, but is not in her chart yet.

## 2022-01-24 NOTE — ANESTHESIA PREPROCEDURE EVALUATION
Relevant Problems   No relevant active problems       Anesthetic History     PONV          Review of Systems / Medical History  Patient summary reviewed, nursing notes reviewed and pertinent labs reviewed    Pulmonary  Within defined limits                 Neuro/Psych         Psychiatric history     Cardiovascular  Within defined limits                     GI/Hepatic/Renal     GERD           Endo/Other      Hypothyroidism  Cancer     Other Findings              Physical Exam    Airway  Mallampati: II  TM Distance: > 6 cm  Neck ROM: normal range of motion   Mouth opening: Normal     Cardiovascular  Regular rate and rhythm,  S1 and S2 normal,  no murmur, click, rub, or gallop             Dental  No notable dental hx       Pulmonary  Breath sounds clear to auscultation               Abdominal  GI exam deferred       Other Findings            Anesthetic Plan    ASA: 2  Anesthesia type: general          Induction: Intravenous  Anesthetic plan and risks discussed with: Patient 1

## 2022-01-25 ENCOUNTER — APPOINTMENT (OUTPATIENT)
Dept: NUCLEAR MEDICINE | Age: 67
End: 2022-01-25
Attending: SURGERY
Payer: COMMERCIAL

## 2022-01-25 ENCOUNTER — HOSPITAL ENCOUNTER (OUTPATIENT)
Age: 67
Setting detail: OUTPATIENT SURGERY
Discharge: HOME | End: 2022-01-25
Attending: SURGERY | Admitting: SURGERY
Payer: COMMERCIAL

## 2022-01-25 ENCOUNTER — ANESTHESIA (OUTPATIENT)
Dept: MEDSURG UNIT | Age: 67
End: 2022-01-25
Payer: COMMERCIAL

## 2022-01-25 VITALS
HEART RATE: 76 BPM | TEMPERATURE: 98.5 F | RESPIRATION RATE: 16 BRPM | SYSTOLIC BLOOD PRESSURE: 135 MMHG | DIASTOLIC BLOOD PRESSURE: 83 MMHG | OXYGEN SATURATION: 94 %

## 2022-01-25 DIAGNOSIS — C50.912 MALIGNANT NEOPLASM OF LEFT FEMALE BREAST, UNSPECIFIED ESTROGEN RECEPTOR STATUS, UNSPECIFIED SITE OF BREAST (HCC): ICD-10-CM

## 2022-01-25 PROCEDURE — 77030011267 HC ELECTRD BLD COVD -A: Performed by: SURGERY

## 2022-01-25 PROCEDURE — 74011250637 HC RX REV CODE- 250/637: Performed by: ANESTHESIOLOGY

## 2022-01-25 PROCEDURE — C9290 INJ, BUPIVACAINE LIPOSOME: HCPCS | Performed by: SURGERY

## 2022-01-25 PROCEDURE — 77030034556 HC PRB MARGN DETECT LUMPECTMY DISP DUNE -G: Performed by: SURGERY

## 2022-01-25 PROCEDURE — 76210000035 HC AMBSU PH I REC 1 TO 1.5 HR: Performed by: SURGERY

## 2022-01-25 PROCEDURE — 88332 PATH CONSLTJ SURG EA ADD BLK: CPT

## 2022-01-25 PROCEDURE — 77030040361 HC SLV COMPR DVT MDII -B: Performed by: SURGERY

## 2022-01-25 PROCEDURE — 77030020268 HC MISC GENERAL SUPPLY: Performed by: SURGERY

## 2022-01-25 PROCEDURE — 74011250636 HC RX REV CODE- 250/636: Performed by: SURGERY

## 2022-01-25 PROCEDURE — 77030010507 HC ADH SKN DERMBND J&J -B: Performed by: SURGERY

## 2022-01-25 PROCEDURE — 77030002996 HC SUT SLK J&J -A: Performed by: SURGERY

## 2022-01-25 PROCEDURE — 74011000250 HC RX REV CODE- 250: Performed by: SURGERY

## 2022-01-25 PROCEDURE — 74011250636 HC RX REV CODE- 250/636: Performed by: NURSE ANESTHETIST, CERTIFIED REGISTERED

## 2022-01-25 PROCEDURE — 76060000062 HC AMB SURG ANES 1 TO 1.5 HR: Performed by: SURGERY

## 2022-01-25 PROCEDURE — 77030010509 HC AIRWY LMA MSK TELE -A: Performed by: NURSE ANESTHETIST, CERTIFIED REGISTERED

## 2022-01-25 PROCEDURE — 2709999900 HC NON-CHARGEABLE SUPPLY: Performed by: SURGERY

## 2022-01-25 PROCEDURE — 74011000250 HC RX REV CODE- 250: Performed by: NURSE ANESTHETIST, CERTIFIED REGISTERED

## 2022-01-25 PROCEDURE — 88307 TISSUE EXAM BY PATHOLOGIST: CPT

## 2022-01-25 PROCEDURE — 88342 IMHCHEM/IMCYTCHM 1ST ANTB: CPT

## 2022-01-25 PROCEDURE — 88341 IMHCHEM/IMCYTCHM EA ADD ANTB: CPT

## 2022-01-25 PROCEDURE — 76030000001 HC AMB SURG OR TIME 1 TO 1.5: Performed by: SURGERY

## 2022-01-25 PROCEDURE — 77030002933 HC SUT MCRYL J&J -A: Performed by: SURGERY

## 2022-01-25 PROCEDURE — 77030031139 HC SUT VCRL2 J&J -A: Performed by: SURGERY

## 2022-01-25 PROCEDURE — 77030041680 HC PNCL ELECSURG SMK EVAC CNMD -B: Performed by: SURGERY

## 2022-01-25 PROCEDURE — 38525 BIOPSY/REMOVAL LYMPH NODES: CPT | Performed by: SURGERY

## 2022-01-25 PROCEDURE — 74011250636 HC RX REV CODE- 250/636: Performed by: ANESTHESIOLOGY

## 2022-01-25 PROCEDURE — 88331 PATH CONSLTJ SURG 1 BLK 1SPC: CPT

## 2022-01-25 PROCEDURE — A9520 TC99 TILMANOCEPT DIAG 0.5MCI: HCPCS

## 2022-01-25 PROCEDURE — 19301 PARTIAL MASTECTOMY: CPT | Performed by: SURGERY

## 2022-01-25 RX ORDER — MIDAZOLAM HYDROCHLORIDE 1 MG/ML
1 INJECTION, SOLUTION INTRAMUSCULAR; INTRAVENOUS AS NEEDED
Status: DISCONTINUED | OUTPATIENT
Start: 2022-01-25 | End: 2022-01-25 | Stop reason: HOSPADM

## 2022-01-25 RX ORDER — SODIUM CHLORIDE 9 MG/ML
25 INJECTION, SOLUTION INTRAVENOUS CONTINUOUS
Status: DISCONTINUED | OUTPATIENT
Start: 2022-01-25 | End: 2022-01-25 | Stop reason: HOSPADM

## 2022-01-25 RX ORDER — FENTANYL CITRATE 50 UG/ML
INJECTION, SOLUTION INTRAMUSCULAR; INTRAVENOUS AS NEEDED
Status: DISCONTINUED | OUTPATIENT
Start: 2022-01-25 | End: 2022-01-25 | Stop reason: HOSPADM

## 2022-01-25 RX ORDER — FENTANYL CITRATE 50 UG/ML
50 INJECTION, SOLUTION INTRAMUSCULAR; INTRAVENOUS AS NEEDED
Status: DISCONTINUED | OUTPATIENT
Start: 2022-01-25 | End: 2022-01-25 | Stop reason: HOSPADM

## 2022-01-25 RX ORDER — SODIUM CHLORIDE, SODIUM LACTATE, POTASSIUM CHLORIDE, CALCIUM CHLORIDE 600; 310; 30; 20 MG/100ML; MG/100ML; MG/100ML; MG/100ML
1000 INJECTION, SOLUTION INTRAVENOUS CONTINUOUS
Status: DISCONTINUED | OUTPATIENT
Start: 2022-01-25 | End: 2022-01-25 | Stop reason: HOSPADM

## 2022-01-25 RX ORDER — ONDANSETRON 4 MG/1
4 TABLET, ORALLY DISINTEGRATING ORAL
Qty: 5 TABLET | Refills: 1 | Status: SHIPPED | OUTPATIENT
Start: 2022-01-25

## 2022-01-25 RX ORDER — OXYCODONE AND ACETAMINOPHEN 5; 325 MG/1; MG/1
1 TABLET ORAL
Qty: 30 TABLET | Refills: 0 | Status: SHIPPED | OUTPATIENT
Start: 2022-01-25 | End: 2022-02-01

## 2022-01-25 RX ORDER — PROPOFOL 10 MG/ML
INJECTION, EMULSION INTRAVENOUS AS NEEDED
Status: DISCONTINUED | OUTPATIENT
Start: 2022-01-25 | End: 2022-01-25 | Stop reason: HOSPADM

## 2022-01-25 RX ORDER — LABETALOL HYDROCHLORIDE 5 MG/ML
INJECTION, SOLUTION INTRAVENOUS AS NEEDED
Status: DISCONTINUED | OUTPATIENT
Start: 2022-01-25 | End: 2022-01-25 | Stop reason: HOSPADM

## 2022-01-25 RX ORDER — PROPOFOL 10 MG/ML
INJECTION, EMULSION INTRAVENOUS
Status: DISCONTINUED | OUTPATIENT
Start: 2022-01-25 | End: 2022-01-25 | Stop reason: HOSPADM

## 2022-01-25 RX ORDER — ONDANSETRON 2 MG/ML
INJECTION INTRAMUSCULAR; INTRAVENOUS AS NEEDED
Status: DISCONTINUED | OUTPATIENT
Start: 2022-01-25 | End: 2022-01-25 | Stop reason: HOSPADM

## 2022-01-25 RX ORDER — SCOLOPAMINE TRANSDERMAL SYSTEM 1 MG/1
1 PATCH, EXTENDED RELEASE TRANSDERMAL
Status: DISCONTINUED | OUTPATIENT
Start: 2022-01-25 | End: 2022-01-25 | Stop reason: HOSPADM

## 2022-01-25 RX ORDER — ROPIVACAINE HYDROCHLORIDE 5 MG/ML
150 INJECTION, SOLUTION EPIDURAL; INFILTRATION; PERINEURAL AS NEEDED
Status: DISCONTINUED | OUTPATIENT
Start: 2022-01-25 | End: 2022-01-25 | Stop reason: HOSPADM

## 2022-01-25 RX ORDER — MIDAZOLAM HYDROCHLORIDE 1 MG/ML
INJECTION, SOLUTION INTRAMUSCULAR; INTRAVENOUS AS NEEDED
Status: DISCONTINUED | OUTPATIENT
Start: 2022-01-25 | End: 2022-01-25 | Stop reason: HOSPADM

## 2022-01-25 RX ORDER — ACETAMINOPHEN 325 MG/1
650 TABLET ORAL ONCE
Status: COMPLETED | OUTPATIENT
Start: 2022-01-25 | End: 2022-01-25

## 2022-01-25 RX ORDER — LIDOCAINE HYDROCHLORIDE 20 MG/ML
INJECTION, SOLUTION EPIDURAL; INFILTRATION; INTRACAUDAL; PERINEURAL AS NEEDED
Status: DISCONTINUED | OUTPATIENT
Start: 2022-01-25 | End: 2022-01-25 | Stop reason: HOSPADM

## 2022-01-25 RX ORDER — DEXAMETHASONE SODIUM PHOSPHATE 4 MG/ML
INJECTION, SOLUTION INTRA-ARTICULAR; INTRALESIONAL; INTRAMUSCULAR; INTRAVENOUS; SOFT TISSUE AS NEEDED
Status: DISCONTINUED | OUTPATIENT
Start: 2022-01-25 | End: 2022-01-25 | Stop reason: HOSPADM

## 2022-01-25 RX ORDER — LIDOCAINE HYDROCHLORIDE 10 MG/ML
0.1 INJECTION, SOLUTION EPIDURAL; INFILTRATION; INTRACAUDAL; PERINEURAL AS NEEDED
Status: DISCONTINUED | OUTPATIENT
Start: 2022-01-25 | End: 2022-01-25 | Stop reason: HOSPADM

## 2022-01-25 RX ADMIN — MIDAZOLAM 2 MG: 1 INJECTION INTRAMUSCULAR; INTRAVENOUS at 09:15

## 2022-01-25 RX ADMIN — FENTANYL CITRATE 25 MCG: 50 INJECTION, SOLUTION INTRAMUSCULAR; INTRAVENOUS at 10:46

## 2022-01-25 RX ADMIN — DEXAMETHASONE SODIUM PHOSPHATE 4 MG: 4 INJECTION, SOLUTION INTRAMUSCULAR; INTRAVENOUS at 09:37

## 2022-01-25 RX ADMIN — FENTANYL CITRATE 25 MCG: 50 INJECTION, SOLUTION INTRAMUSCULAR; INTRAVENOUS at 09:47

## 2022-01-25 RX ADMIN — ONDANSETRON HYDROCHLORIDE 4 MG: 2 INJECTION, SOLUTION INTRAMUSCULAR; INTRAVENOUS at 10:21

## 2022-01-25 RX ADMIN — PROPOFOL 150 MCG/KG/MIN: 10 INJECTION, EMULSION INTRAVENOUS at 09:28

## 2022-01-25 RX ADMIN — LIDOCAINE HYDROCHLORIDE 60 MG: 20 INJECTION, SOLUTION EPIDURAL; INFILTRATION; INTRACAUDAL; PERINEURAL at 09:29

## 2022-01-25 RX ADMIN — FENTANYL CITRATE 25 MCG: 50 INJECTION, SOLUTION INTRAMUSCULAR; INTRAVENOUS at 09:49

## 2022-01-25 RX ADMIN — FENTANYL CITRATE 25 MCG: 50 INJECTION, SOLUTION INTRAMUSCULAR; INTRAVENOUS at 10:13

## 2022-01-25 RX ADMIN — ONDANSETRON HYDROCHLORIDE 4 MG: 2 INJECTION, SOLUTION INTRAMUSCULAR; INTRAVENOUS at 09:37

## 2022-01-25 RX ADMIN — LABETALOL HYDROCHLORIDE 5 MG: 5 INJECTION INTRAVENOUS at 10:13

## 2022-01-25 RX ADMIN — FENTANYL CITRATE 25 MCG: 50 INJECTION, SOLUTION INTRAMUSCULAR; INTRAVENOUS at 09:39

## 2022-01-25 RX ADMIN — PROPOFOL 150 MG: 10 INJECTION, EMULSION INTRAVENOUS at 09:29

## 2022-01-25 RX ADMIN — ACETAMINOPHEN 650 MG: 325 TABLET ORAL at 08:58

## 2022-01-25 RX ADMIN — LABETALOL HYDROCHLORIDE 5 MG: 5 INJECTION INTRAVENOUS at 10:16

## 2022-01-25 RX ADMIN — FENTANYL CITRATE 25 MCG: 50 INJECTION, SOLUTION INTRAMUSCULAR; INTRAVENOUS at 10:09

## 2022-01-25 RX ADMIN — SODIUM CHLORIDE, POTASSIUM CHLORIDE, SODIUM LACTATE AND CALCIUM CHLORIDE: 600; 310; 30; 20 INJECTION, SOLUTION INTRAVENOUS at 08:57

## 2022-01-25 RX ADMIN — FENTANYL CITRATE 25 MCG: 50 INJECTION, SOLUTION INTRAMUSCULAR; INTRAVENOUS at 09:29

## 2022-01-25 RX ADMIN — WATER 2 G: 1 INJECTION INTRAMUSCULAR; INTRAVENOUS; SUBCUTANEOUS at 09:35

## 2022-01-25 RX ADMIN — FENTANYL CITRATE 25 MCG: 50 INJECTION, SOLUTION INTRAMUSCULAR; INTRAVENOUS at 10:42

## 2022-01-25 NOTE — ROUTINE PROCESS
Patient: Maura Rosario MRN: [id number]  SSN: [id number]   YOB: 1955  Age: 78 y.o. Sex: female     Patient is status post Procedure(s):  LEFT BREAST ULTRASOUND GUIDED LUMPECTOMY AND LEFT BREAST SENTINEL NODE BIOPSY. Surgeon(s) and Role:     Mercedes Ascencio MD - Primary    Local/Dose/Irrigation:  SEE MAR                  Peripheral IV 01/25/22 Right;Posterior Hand (Active)   Site Assessment Clean, dry, & intact 01/25/22 0857   Phlebitis Assessment 0 01/25/22 0857   Infiltration Assessment 0 01/25/22 0857   Dressing Status Clean, dry, & intact 01/25/22 0857   Dressing Type Transparent;Tape 01/25/22 0857   Hub Color/Line Status Blue; Infusing 01/25/22 0857            Airway - Endotracheal Tube 01/25/22 (Active)                   Dressing/Packing:  Incision 01/25/22 Breast Left-Dressing/Treatment: Gauze dressing/dressing sponge;Surgical bra;Surgical glue (01/25/22 1027)    Splint/Cast:  ]    Other:

## 2022-01-25 NOTE — OP NOTES
[facility]  OPERATIVE REPORT    Name:  Andria Booker  MR#:  [id number]  :  1955  ACCOUNT #:  [de-identified]  DATE OF SERVICE:  2022      PREOPERATIVE DIAGNOSIS:  Left breast cancer, upper outer quadrant. POSTOPERATIVE DIAGNOSIS:  Left breast cancer, upper outer quadrant. PROCEDURE PERFORMED:  Left breast ultrasound-guided lumpectomy, left deep axillary sentinel node biopsy, intraop margin assessment using RF spectroscopy. SURGEON:  Hakeem Yadav MD    ASSISTANT:  Tess Ha. ANESTHESIA:  General.    COMPLICATIONS:  None. SPECIMENS REMOVED:  1. Left axillary sentinel node #1.  2.  Left axillary sentinel node #2. 3.  Left axillary sentinel node #3. 4.  Left breast lumpectomy. 5.  Left breast superior margin. 6.  Left breast inferior margin. 7.  Left breast anterior margin. IMPLANTS:  None. ESTIMATED BLOOD LOSS:  Minimal.    DRAINS:  None. FINDINGS:  [place] lymph nodes, negative on frozen. INDICATIONS:  The patient is a 79-year-old female with breast cancer in upper outer quadrant of left breast.  She wished to have a lumpectomy and deep axillary sentinel node biopsy. PROCEDURE:  The patient initially went to Nuclear Medicine where technetium-99 was injected to left breast, she tolerated this well. She went to the preop holding area where surgical site was marked by surgeon. Informed consent was obtained. She was taken to the operating room and laid in supine position where LMA anesthesia was induced. Left breast prepped and draped in usual fashion and time-out was performed. Attention was turned to left axilla. An inferior axillary hairline incision was made with a 15-blade. Bovie cautery was used to dissect through the axillary fascia. Three sentinel nodes were identified using Neoprobe guidance and excised with LigaSure and sent for frozen section and found to be negative. Next, cavity was irrigated.   A mixture of 20 mL of Exparel with 30 mL of 0.25% Marcaine plain was mixed together and 20 mL of this was injected in the left axilla. The axillary fascia was closed with interrupted 3-0 Vicryl and the skin with 4-0 subcuticular Monocryl. Attention was turned to left breast at 2 o'clock where the lesion and clip were identified using ultrasound guidance. A 10-blade was used to make a curvilinear incision. Bovie cautery was used to dissect down around the lesion all way to the chest wall; this was excised, marked short superior and long stitch lateral and sent for permanent pathology. Prior to sending the specimen off, the MarginProbe device was plugged in and calibrated. All six margins were assessed. For additional margins, please see above. Total intraop time was 2 minutes. Next, 30 mL of Exparel mixture was injected into the soft tissue, chest wall and skin. A VeraForm suture was used to line the perimeter of the lumpectomy cavity. Deeper tissues were closed with interrupted 2-0 Vicryl, interrupted 3-0 Vicryl for the skin and 4-0 subcuticular Monocryl for the skin. Skin glue was placed on incision as well as a pressure dressing and a bra. All sponge, needle and instruments counts were correct. The patient went to recovery in stable condition.       Lillard Cranker, MD [email or web address]  D:  01/25/2022 10:37  T:  01/25/2022 11:02  JOB #:  [id number]

## 2022-01-25 NOTE — PERIOP NOTES
Updated  and daughter (nurse) regarding care in PACU and plan for home care. All questions answered.

## 2022-01-25 NOTE — DISCHARGE INSTRUCTIONS
Discharge Instructions from Dr. Jacki Chaney    · I will call you with the pathology results, typically within 1 week from today. · You may shower, but no hot tubs, swimming pools, or baths until your incision is healed. · No heavy lifting with the affected extremity (nothing greater than 5 pounds), and limit its use for the next 4-5 days. · You may use an ice pack for comfort for the next couple of days, but do not place ice directly on the skin. Rather, use a towel or clothing to serve as a barrier between skin and ice to prevent injury. · If I placed a drain, follow the drain instructions provided, especially as you keep a record of the drain output. · Follow medication instructions carefully. No aspirin, ibuprofen or aleve. May take tylenol instead of narcotic. · Wear surgical bra for 24 hours, then remove. Wear supportive bra at all times. · You will have bruising and swelling  · Watch for signs of infection as listed below. · Redness  · Swelling  · Drainage from the incision or from your nipple that appears infected  · Fever over 101.5 degrees for consecutive readings, or over 99.5 if you are currently undergoing chemotherapy. · Call our office (number is below) for a follow-up appointment. · If you have any problems, our phone number is [phone]      DISCHARGE SUMMARY from Nurse    PATIENT INSTRUCTIONS:    After general anesthesia or intravenous sedation, for 24 hours or while taking prescription Narcotics:  · Limit your activities  · Do not drive and operate hazardous machinery  · Do not make important personal or business decisions  · Do  not drink alcoholic beverages  · If you have not urinated within 8 hours after discharge, please contact your surgeon on call.     Report the following to your surgeon:  · Excessive pain, swelling, redness or odor of or around the surgical area  · Temperature over 100.5  · Nausea and vomiting lasting longer than 4 hours or if unable to take medications  · Any signs of decreased circulation or nerve impairment to extremity: change in color, persistent  numbness, tingling, coldness or increase pain  · Any questions    What to do at Home:                                                              POST ANESTHESIA INSTRUCTIONS  Recommended activity: See surgical instructions. If you experience any of the following symptoms as noted above, please follow up with Dr. Hilda Mares. *  Please give a list of your current medications to your Primary Care Provider. *  Please update this list whenever your medications are discontinued, doses are      changed, or new medications (including over-the-counter products) are added. *  Please carry medication information at all times in case of emergency situations. You have a scopolamine patch behind your LEFT ear. This is to help prevent nausea. This patch can be worn for up to 3 DAYS. . The most common side effects are dry mouth/dry eyes. If you are not experiencing nausea and are experiencing side effects you may remove the patch sooner. Please remove the patch no later than 3 DAYS. Be sure to dispose of patch where children or pets cannot access it, wash your hands thoroughly, and do not touch your eyes. These are general instructions for a healthy lifestyle:    No smoking/ No tobacco products/ Avoid exposure to second hand smoke  Surgeon General's Warning:  Quitting smoking now greatly reduces serious risk to your health. Obesity, smoking, and sedentary lifestyle greatly increases your risk for illness    A healthy diet, regular physical exercise & weight monitoring are important for maintaining a healthy lifestyle    You may be retaining fluid if you have a history of heart failure or if you experience any of the following symptoms:  Weight gain of 3 pounds or more overnight or 5 pounds in a week, increased swelling in our hands or feet or shortness of breath while lying flat in bed.   Please call your doctor as soon as you notice any of these symptoms; do not wait until your next office visit. The discharge information has been reviewed with the patient and spouse. The patient and spouse verbalized understanding. Discharge medications reviewed with the patient and spouse and appropriate educational materials and side effects teaching were provided.

## 2022-01-25 NOTE — ANESTHESIA POSTPROCEDURE EVALUATION
Post-Anesthesia Evaluation and Assessment    Patient: Shireen Mcgarry MRN: [id number]  SSN: [id number]    YOB: 1955  Age: 78 y.o. Sex: female      I have evaluated the patient and they are stable and ready for discharge from the PACU. Cardiovascular Function/Vital Signs  Visit Vitals  /83   Pulse 80   Temp 36.9 °C (98.5 °F)   Resp 21   SpO2 100%       Patient is status post General anesthesia for Procedure(s):  LEFT BREAST ULTRASOUND GUIDED LUMPECTOMY AND LEFT BREAST SENTINEL NODE BIOPSY. Nausea/Vomiting: None    Postoperative hydration reviewed and adequate. Pain:  Pain Scale 1: Numeric (0 - 10) (01/25/22 0858)  Pain Intensity 1: 0 (01/25/22 0858)   Managed    Neurological Status:   Neuro (WDL): Within Defined Limits (01/25/22 0845)   At baseline    Mental Status, Level of Consciousness: Alert and  oriented to person, place, and time    Pulmonary Status:   O2 Device: Nasal cannula (01/25/22 1046)   Adequate oxygenation and airway patent    Complications related to anesthesia: None    Post-anesthesia assessment completed. No concerns    Signed By: Yesi Renteria MD     January 25, 2022              Procedure(s):  LEFT BREAST ULTRASOUND GUIDED LUMPECTOMY AND LEFT BREAST SENTINEL NODE BIOPSY. general    <BSHSIANPOST>    INITIAL Post-op Vital signs:   Vitals Value Taken Time   /83 01/25/22 1050   Temp 36.9 °C (98.5 °F) 01/25/22 1046   Pulse 76 01/25/22 1055   Resp 17 01/25/22 1055   SpO2 99 % 01/25/22 1055   Vitals shown include unvalidated device data.

## 2022-01-25 NOTE — BRIEF OP NOTE
Brief Postoperative Note    Patient: Ian Reyes  YOB: 1955  MRN: [id number]    Date of Procedure: 1/25/2022     Pre-Op Diagnosis: LEFT BREAST CANCER    Post-Op Diagnosis: Same as preoperative diagnosis. Procedure(s):  LEFT BREAST ULTRASOUND GUIDED LUMPECTOMY AND LEFT BREAST SENTINEL NODE BIOPSY    Surgeon(s):   Keisha Diaz MD    Surgical Assistant: Surg Asst-1: Bert COSTA    Anesthesia: General     Estimated Blood Loss (mL): Minimal    Complications: None    Specimens:   ID Type Source Tests Collected by Time Destination   1 : LEFT AXILLARY SENTINEL NODE #1 Frozen Section Lymph Node  Keisha Diaz MD 1/25/2022 6356 Pathology   2 : LEFT AXILLARY SENTINEL NODE #2 Frozen Section Lymph Node  Keisha Diaz MD 1/25/2022 5466 Pathology   3 : LEFT AXILLARY SENTINEL NODE #3 Frozen Section Lymph Node  Keisha Diaz MD 1/25/2022 1109 Pathology   4 : LEFT BREAST LUMPECTOMY Fresh Breast  Keisha Diaz MD 1/25/2022 1005 Pathology   5 : LEFT BREAST SUPERIOR MARGIN Rona Breast  Keisha Diaz MD 1/25/2022 1008 Pathology   6 : LEFT BREAST INFERIOR MEDIAL MARGIN Rona Breast  Keisha Diaz MD 1/25/2022 1010 Pathology   7 : LEFT BREAST ANTERIOR MARGIN Rona Breast  Keisha Diaz MD 1/25/2022 1011 Pathology        Implants: * No implants in log *    Drains: * No LDAs found *    Findings: sln negative on frozen    Electronically Signed by Hakeem Yadav MD on 1/25/2022 at 10:33 AM  Dictated stat

## 2022-01-25 NOTE — INTERVAL H&P NOTE
Update History & Physical    The Patient's History and Physical of 1/25/2021  Left us guided lumpectomy, sln biospy  was reviewed with the patient and I examined the patient. There was no change. The surgical site was confirmed by the patient and me. Plan:  The risk, benefits, expected outcome, and alternative to the recommended procedure have been discussed with the patient. Patient understands and wants to proceed with the procedure.     Electronically signed by Gerry Garcia MD on 1/25/2022 at 8:08 AM

## 2022-01-26 ENCOUNTER — NURSE NAVIGATOR (OUTPATIENT)
Dept: CASE MANAGEMENT | Age: 67
End: 2022-01-26

## 2022-01-26 NOTE — PROGRESS NOTES
[address]  Breast Navigator Encounter    Name:    Petros Carson  Age:    78 y.o. Diagnosis:   LEFT breast cancer    Interdisciplinary Team:  Med-Onc:    Surg-Onc:    Dr. Monahan Mast:    Plastics:    :     Nurse Navigator:  Marii Mclean RN, BSN, [facility]      Encounter type:  [x]Patient Initiated  []Navigator Follow-up []Pre-op  []Post-op  []Check-in Prior to First Treatment []Treatment Modality Change  []Survivorship Transition []Other:       Narrative:    Returned patient's call. Asking about when she can go back on her supplements. I told her to way a day or so, and she will do this. Is doing great today, having no pain. Encouraged her to use ice today and tomorrow. Her daughter is giving her the pain pills when they are due. Cautioned her about constipation while taking this much pain medication. She will send her  to the store to get her some Miralax. Recommended alternating the pain pills with Tylenol, which she will try. She has a follow-up appointment scheduled on 2/7 with Dr. Kelvin Eduardo. I will try to meet her at that appointment. I will continue to follow her.          Marii Mclean, RN, BSN, [facility]  Oncology Breast Navigator     [address]  [address] [place] [address], [address] [facility] [address].  W: [phone]  F: [phone]  [email or web address]  Good Help to Those in [facility]

## 2022-01-31 NOTE — PROGRESS NOTES
Called with surgical pathology  T2 N1 mi  Mammaprint from biopsy pending  Plan will depend on this.    She is doing well postop

## 2022-02-07 ENCOUNTER — NURSE NAVIGATOR (OUTPATIENT)
Dept: CASE MANAGEMENT | Age: 67
End: 2022-02-07

## 2022-02-07 ENCOUNTER — OFFICE VISIT (OUTPATIENT)
Dept: SURGERY | Age: 67
End: 2022-02-07
Payer: COMMERCIAL

## 2022-02-07 DIAGNOSIS — C50.912 BREAST CANCER METASTASIZED TO AXILLARY LYMPH NODE, LEFT (HCC): Primary | ICD-10-CM

## 2022-02-07 DIAGNOSIS — C77.3 BREAST CANCER METASTASIZED TO AXILLARY LYMPH NODE, LEFT (HCC): Primary | ICD-10-CM

## 2022-02-07 PROCEDURE — 99024 POSTOP FOLLOW-UP VISIT: CPT | Performed by: SURGERY

## 2022-02-07 NOTE — PROGRESS NOTES
HISTORY OF PRESENT ILLNESS  Piyush Lipscomb is a 78 y.o. female. HPI   ESTABLISHED patient here for post op follow up, s/p LEFT breast lumpectomy and SNBx. She is doing well. Has some bruising to the breast.  No pain today. Mammaprint low risk luminal A  1/25/21 - LEFT breast lumpectomy and SNBx  FINAL PATHOLOGIC DIAGNOSIS    1. Left axilla, sentinel lymph node #1; dissection:        Micrometastatic carcinoma identified in one of two lymph nodes (1mi   /2); (see comment). 2. Left axilla, sentinel lymph node #2; dissection:        No metastatic carcinoma identified in one lymph node (0/1); (see   comment). 3. Left axilla, sentinel lymph node #3; dissection:        No metastatic carcinoma identified in one lymph node (0/1); (see   comment). 4. Left breast; lumpectomy:        Invasive ductal carcinoma, histologic grade II/III, multiple foci;   (see synoptic report). Previous procedure related changes. Invasive carcinoma is present at 0.1 cm from the inked anterior and at   0.15 cm from the inked lateral specimen margin. 5. Left breast, superior margin; excision:        Ductal carcinoma in situ, nuclear grade 1, cribriform and   micropapillary types. DCIS is present less than 0.5 mm from the inked and cauterized new   superior margin. 6. Left breast, inferior-medial margin; excision:        Benign breast tissue, no invasive or in situ carcinoma identified. 7. Left breast, anterior margin; excision:        Benign fibroadipose tissue, no invasive or in situ carcinoma   identified. SYNOPTIC REPORT: INVASIVE CARCINOMA OF THE BREAST: Resection    SPECIMEN       Procedure: Excision (less than total mastectomy)       Specimen Laterality: Left    TUMOR       Tumor Site: Clock position - 10 o'clock       Histologic Type:  Invasive carcinoma of no special type (ductal)       Glandular (Acinar) / Tubular Differentiation: Score 3       Nuclear Pleomorphism: Score 2       Mitotic Rate: Score 1       Overall Grade: Grade 2 (scores of 6 or 7)       Tumor Size: 25 x 20 x 18 mm       Tumor Focality: Multiple foci of invasive carcinoma           Number of Foci: At least: 4       Sizes of Individual Foci (Millimeters): 25 mm       Ductal Carcinoma In Situ (DCIS): Present (only in specimen #5)           Architectural Patterns: Cribriform, Micropapillary           Nuclear Grade: Grade I           Necrosis: Not identified       Lobular Carcinoma In Situ (LCIS): Not identified    Tumor Extent       Lymphovascular Invasion: Present       Dermal Lymphovascular Invasion: No skin present       Microcalcifications: Present in non-neoplastic tissue       Treatment Effect in the Breast: No known presurgical therapy    MARGINS       Invasive Carcinoma Margins: Uninvolved by invasive carcinoma           Distance from Closest Margin (Millimeters): 1.0 mm    Distance from Other Margins       Lateral Margin (Millimeters): 1.5 mm       DCIS Margins: Uninvolved by DCIS           Distance from Closest Margin (Millimeters): Less than: 0.5 mm           Closest Margin(s): New superior (in specimen #5)    LYMPH NODES       Regional Lymph Nodes: Involved by tumor cells           Number of Lymph Nodes with Macrometastases (> 2 mm): 0           Number of Lymph Nodes with Micrometastases (> 0.2 mm to 2 mm and /   or > 200 cells): 1           Size of Largest Metastatic Deposit (Millimeters): 0.4 mm           Extranodal Extension: Not identified           Total Number of Lymph Nodes Examined: 4           Number of [place] Nodes Examined: 4    PATHOLOGIC STAGE CLASSIFICATION (pTNM, AJCC 8th Edition)       TNM Descriptors: m (multiple foci of invasive carcinoma)       Primary Tumor (pT): pT2       Regional Lymph Nodes Modifier: (sn): [place] node(s) evaluated       Regional Lymph Nodes (pN): pN1mi    SPECIAL STUDIES       Breast Biomarker Testing Performed on Previous Biopsy: Estrogen   Receptor (ER), Progesterone Receptor (PgR), HER2 (by immunohistochemistry), HER2 (by in situ hybridization), Ki-67           Estrogen Receptor (ER) Status: Positive           Progesterone Receptor (PgR) Status: Positive           HER2 (by immunohistochemistry): Equivocal (Score 2+)           HER2 (by in situ hybridization): Negative (not amplified)           Ki-67 Percentage of Positive Nuclei: 10%           Testing Performed on Case Number: [id number]   Comment    Immunohistochemical staining with pancytokeratin AE1/AE3 performed on the   sentinel lymph node tissue sections (1A-1C, 2A and 3A) highlights the   micrometastasis and small isolated tumor cell collections on slide 1A and   is negative on the reminder of the slides, supportive of the above   diagnosis. P63 staining performed on representative tumor sections (4C, 4D and 4H) is   negative confirming the absence of a myoepithelial cell layer within the   largest tumor focus. E-cadherin stain performed on tissue block 4H shows retained expression   within the tumor cells supportive of a ductal phenotype. 21- US guided biopsy of LEFT breast, LEFT IDC, grd 2, ER %, TN %, HER2 equivocal, FISH negative, ki-67 20%     - History of right breast biopsy, benign     Family History: Brother, dx at 70, colon cancer,   Review of Systems   All other systems reviewed and are negative. Physical Exam  Vitals and nursing note reviewed. Chest:          Comments: Left breast resolving ecchymosis  Incisions healing well         ASSESSMENT and PLAN    ICD-10-CM ICD-9-CM    1.  Breast cancer metastasized to axillary lymph node, left (HCC)  C50.912 174.9     C77.3 196.3      - doing well  Low risk mammaprint chemo unlikely  Will need xrt and endocrine therapy  Will refer to med onc, rad onc  F/u with np 4-6 months

## 2022-02-07 NOTE — PATIENT INSTRUCTIONS
Breast Cancer: Care Instructions  Your Care Instructions     Breast cancer occurs when abnormal cells grow out of control in the breast. These cancer cells can spread within the breast, to nearby lymph nodes and other tissues, and to other parts of the body. Being treated for cancer can weaken your body, and you may feel very tired. Get the rest your body needs so you can feel better. Finding out that you have cancer is scary. You may feel many emotions and may need some help coping. Seek out family, friends, and counselors for support. You also can do things at home to make yourself feel better while you go through treatment. Call the [employer] (7-454.122.1072) or visit its website at [employer] for more information. Follow-up care is a key part of your treatment and safety. Be sure to make and go to all appointments, and call your doctor if you are having problems. It's also a good idea to know your test results and keep a list of the medicines you take. How can you care for yourself at home? · Take your medicines exactly as prescribed. Call your doctor if you think you are having a problem with your medicine. You may get medicine for nausea and vomiting if you have these side effects. · Follow your doctor's instructions to relieve pain. Pain from cancer and surgery can almost always be controlled. Use pain medicine when you first notice pain, before it becomes severe. · Eat healthy food. If you do not feel like eating, try to eat food that has protein and extra calories to keep up your strength and prevent weight loss. Drink liquid meal replacements for extra calories and protein. Try to eat your main meal early. · Get some physical activity every day, but do not get too tired. Keep doing the hobbies you enjoy as your energy allows. · Do not smoke. Smoking can make your cancer worse. If you need help quitting, talk to your doctor about stop-smoking programs and medicines.  These can increase your chances of quitting for good. · Take steps to control your stress and workload. Learn relaxation techniques. ? Share your feelings. Stress and tension affect our emotions. By expressing your feelings to others, you may be able to understand and cope with them. ? Consider joining a support group. Talking about a problem with your spouse, a good friend, or other people with similar problems is a good way to reduce tension and stress. ? Express yourself through art. Try writing, crafts, dance, or art to relieve stress. Some dance, writing, or art groups may be available just for people who have cancer. ? Be kind to your body and mind. Getting enough sleep, eating a healthy diet, and taking time to do things you enjoy can contribute to an overall feeling of balance in your life and can help reduce stress. ? Get help if you need it. Discuss your concerns with your doctor or counselor. · If you are vomiting or have diarrhea:  ? Drink plenty of fluids to prevent dehydration. Choose water and other clear liquids. If you have kidney, heart, or liver disease and have to limit fluids, talk with your doctor before you increase the amount of fluids you drink. ? When you are able to eat, try clear soups, mild foods, and liquids until all symptoms are gone for 12 to 48 hours. Other good choices include dry toast, crackers, cooked cereal, and gelatin dessert, such as Jell-O.  · If you have not already done so, prepare a list of advance directives. Advance directives are instructions to your doctor and family members about what kind of care you want if you become unable to speak or express yourself. When should you call for help? Call 911 anytime you think you may need emergency care. For example, call if:    · You passed out (lost consciousness).    Call your doctor now or seek immediate medical care if:    · You have a fever.     · You have abnormal bleeding.     · You think you have an infection.     · You have new or worse pain.     · You have new symptoms, such as a cough, belly pain, vomiting, diarrhea, or a rash. Watch closely for changes in your health, and be sure to contact your doctor if:    · You are much more tired than usual.     · You have swollen glands in your armpits, groin, or neck.     · You do not get better as expected. Where can you learn more? Go to http://www.toth.com/  Enter V321 in the search box to learn more about \"Breast Cancer: Care Instructions. \"  Current as of: December 17, 2020               Content Version: 13.0  © 3590-0331 [employer]. Care instructions adapted under license by [employer] (which disclaims liability or warranty for this information). If you have questions about a medical condition or this instruction, always ask your healthcare professional. [address] any warranty or liability for your use of this information.

## 2022-02-09 ENCOUNTER — NURSE NAVIGATOR (OUTPATIENT)
Dept: CASE MANAGEMENT | Age: 67
End: 2022-02-09

## 2022-02-09 NOTE — PROGRESS NOTES
[address]  Breast Navigator Encounter    Name:    Benjamin Vincent  Age:    78 y.o. Diagnosis:    LEFT breast cancer    Met patient and  briefly when she was in the office for her post-op check. She has done well post-op. Provided patient with a key chain, business card and navigator flyers. I will continue to follow her.           Paolo Renteria RN, BSN, [facility]  Oncology Breast Navigator     [address]  [address] [place] [address] 22.  W: [phone]  F: [phone]  [email or web address]  Good Help to Those in [facility]

## 2022-02-09 NOTE — PROGRESS NOTES
[address] [facility]   Breast Navigator Encounter    Name:    Epifanio Casey  Age:    78 y.o. Diagnosis:    LEFT breast cancer    Interdisciplinary Team:  Med-Onc:    Dr. Azucena Leonard  Surg-Onc:    Dr. Garry Andrea  Rad-Onc:    Dr. Navjot José  Plastics:    :    Levon Acharya  Nurse Navigator:  Juan Antonio Salcedo RN, BSN, [address] [facility]      Encounter type:  [x]Patient Initiated  []Navigator Follow-up []Pre-op  []Post-op  []Check-in Prior to First Treatment []Treatment Modality Change  []Survivorship Transition []Other:       Narrative:    Patient called inquiring about her appointments with medical oncology and radiation oncology. I told her that she should hear from the  by the end of the week. Has some increased \"soreness\" in the breast, might be a little more swollen. I told her that she may have a little seroma. Suggested ice, NSAIDs and re-evaluation if this worsens. Says that it is not bad enough to even need Advil. I told her that this could be drained, and this should be based on symptoms. Told her about \"zingers\" just in case she develops these. She has restarted her walking and says that this feels good. Has a good supportive sports bra.           Juan Antonio Salcedo RN, BSN, [address] [facility]  Oncology Breast Navigator     [address] [place] [address]   [address] [facility]  [address].  W: [phone]  F: [phone]  [email or web address]  Good Help to Those in [facility]

## 2022-02-10 ENCOUNTER — TELEPHONE (OUTPATIENT)
Dept: ONCOLOGY | Age: 67
End: 2022-02-10

## 2022-02-10 NOTE — TELEPHONE ENCOUNTER
I received a voicemail from Dr Haley Oleary office asking me to call patient to get them scheduled with Dr Alison Foster. Called 2/10 @ 10:16a. No answer and vm is full.

## 2022-02-22 DIAGNOSIS — C50.912 MALIGNANT NEOPLASM OF LEFT FEMALE BREAST, UNSPECIFIED ESTROGEN RECEPTOR STATUS, UNSPECIFIED SITE OF BREAST (HCC): ICD-10-CM

## 2022-02-22 DIAGNOSIS — C77.3 SECONDARY MALIGNANT NEOPLASM OF AXILLARY LYMPH NODES (HCC): Primary | ICD-10-CM

## 2022-02-24 ENCOUNTER — OFFICE VISIT (OUTPATIENT)
Dept: ONCOLOGY | Age: 67
End: 2022-02-24
Payer: COMMERCIAL

## 2022-02-24 ENCOUNTER — DOCUMENTATION ONLY (OUTPATIENT)
Dept: SURGERY | Age: 67
End: 2022-02-24

## 2022-02-24 VITALS
TEMPERATURE: 96.9 F | DIASTOLIC BLOOD PRESSURE: 84 MMHG | HEART RATE: 83 BPM | BODY MASS INDEX: 28.95 KG/M2 | OXYGEN SATURATION: 98 % | SYSTOLIC BLOOD PRESSURE: 129 MMHG | WEIGHT: 163.4 LBS | HEIGHT: 63 IN

## 2022-02-24 DIAGNOSIS — C50.912 MALIGNANT NEOPLASM OF LEFT BREAST IN FEMALE, ESTROGEN RECEPTOR POSITIVE, UNSPECIFIED SITE OF BREAST (HCC): Primary | ICD-10-CM

## 2022-02-24 DIAGNOSIS — Z17.0 MALIGNANT NEOPLASM OF LEFT BREAST IN FEMALE, ESTROGEN RECEPTOR POSITIVE, UNSPECIFIED SITE OF BREAST (HCC): Primary | ICD-10-CM

## 2022-02-24 DIAGNOSIS — N95.1 HOT FLASHES DUE TO MENOPAUSE: ICD-10-CM

## 2022-02-24 DIAGNOSIS — Z86.59 HISTORY OF PANIC ATTACKS: ICD-10-CM

## 2022-02-24 DIAGNOSIS — Z98.890 HISTORY OF LUMPECTOMY OF LEFT BREAST: ICD-10-CM

## 2022-02-24 PROCEDURE — 99205 OFFICE O/P NEW HI 60 MIN: CPT | Performed by: INTERNAL MEDICINE

## 2022-02-24 NOTE — PATIENT INSTRUCTIONS
Anastrozole (Arimidex®)   This information is about a hormonal therapy used to treat breast cancer called anastrozole, which is also called Arimidex®. Throughout this page we refer to it by its more commonly used name, Arimidex. This information should ideally be read with our general information about breast cancer or secondary breast cancer. Arimidex   Arimidex is a type of hormonal therapy used to treat breast cancer in women who have been through the menopause (change of life). You will see your doctor regularly while you have this treatment so they can monitor its effects. Hormonal therapies interfere with the production or action of particular hormones in the body. Hormones are substances produced naturally in the body. They act as chemical messengers and help control the activity of cells and organs. Aromatase inhibitors   Many breast cancers rely on the hormone oestrogen to grow. These cancers are known as hormone-sensitive breast cancers. In women who have had their menopause, the main source of oestrogen is through the conversion of androgens (sex hormones produced by the adrenal glands) into oestrogens. This is carried out by an enzyme called aromatase. The conversion process is known as aromatisation, and it happens mainly in the fatty tissues of the body. Arimidex is a drug that blocks the process of aromatisation, and so reduces the amount of oestrogen in the body. As less oestrogen reaches the cancer cells, they grow more slowly or stop growing altogether. Drugs that work in this way are known as aromatase inhibitors. Other aromatase inhibitors include letrozole (Femara®) and exemestane (Aromasin®). How Arimidex is taken   Arimidex is a tablet that is taken once a day. It should be swallowed whole with a glass of water, at about the same time each day. It doesn't matter whether this is in the morning or evening.    When it is given   Your doctor will take into account a number of different factors when planning your treatment. Arimidex is used to treat post-menopausal women with hormone-sensitive breast cancer. Early breast cancer   Arimidex may be given to women with early breast cancer (cancer that has not spread) after they have had surgery to remove the cancer. Giving treatment after surgery to reduce the chance of the cancer coming back is known as adjuvant therapy. For some women, Arimidex may be more effective than tamoxifen, and it has different side effects. Studies show that switching to Arimidex after taking tamoxifen for 2-3 years may be better than five years of tamoxifen for some women. Advanced breast cancer   Arimidex can be used to treat women who have breast cancer that has spread to other parts of the body (advanced or secondary breast cancer). It can also be used to treat women whose breast cancer has come back after initial treatment. You might find our information about staging and grading of breast cancer useful. Length of treatment   Your doctors will discuss the length of treatment they feel is appropriate for you. Arimidex may be given over a number of years, or for as long as it is controlling your cancer, depending on your individual situation. Possible side effects   Each person's reaction to any medicine is different. Most people have very few side effects with Arimidex, while others may experience more. The side effects described here won't affect everyone and may be different if you are taking more than one drug. We have outlined the most common side effects, but haven't included those that are rare and therefore unlikely to affect you. If you notice any effects which aren't listed here, discuss them with your doctor or nurse. You may have some of the following side effects, to varying degrees:   Hot flushes and sweats   These are usually mild and may wear off after a period of time.  Sometimes people find it helps to cut down on tea, coffee, nicotine and alcohol. Research suggests that hormones called progestogens or some types of antidepressants may be helpful in controlling this side effect. Your doctor or nurse can discuss this with you. Some people find complementary therapies such as acupuncture helpful. Your GP may be able to give you details about having these on the NHS. If you find your own therapist, make sure that they are properly qualified and registered. You can read more about treatments for menopausal symptoms like hot flushes in our information about managing menopausal symptoms. Vaginal dryness   This may occur while using Arimidex. Gels that can help to overcome the dryness are available. You can buy these from a  or your doctor can prescribe them. Feeling sick (nausea) and being sick (vomiting)   These side effects are rare and usually mild. They can usually be effectively treated, so let your doctor know if you are affected. Feeling sick can often be relieved by taking your tablet with food or at night. Diarrhoea   If you have diarrhoea it's important to drink plenty of fluids. Hair thinning   Some women notice that their hair becomes thinner while taking Arimidex. This is usually mild and the hair grows back at the end of treatment. Headaches   Some people have headaches while taking Arimidex, but this is not common. It's important to drink plenty of fluids. Let your doctor know if you are getting headaches, as they can prescribe medication to help. Skin rashes   Rarely, Arimidex can cause skin rashes. Vaginal bleeding   Some women have some vaginal bleeding, usually in the first few weeks of treatment. This is rare and usually occurs after changing from other hormonal therapies to treatment with Arimidex. If the bleeding continues, tell your doctor or breast care nurse. Joint pains/muscular stiffness   Some women have pain and stiffness in their joints while taking Arimidex.  Let your doctor know if these effects are a problem. You may find it helpful to take mild painkillers. Tiredness (fatigue) and lethargy   Some people can have increased tiredness, especially at the start of treatment. It's important to get plenty of rest. If you are very sleepy you should not drive or operate machinery. Risk of osteoporosis   Women who have, or are at risk of, osteoporosis (weakened bones), should have their bone strength assessed before and during treatment with Arimidex. Some women may need to take bone-strengthening drugs to help prevent osteoporosis developing. Always let your doctor or nurse know about any side effects you have. There are usually ways in which they can be controlled or improved. Things to remember about Arimidex tablets    Arimidex may interact with other medicines. Tell your doctor about any medicines you are taking, including non-prescribed drugs such as complementary therapies, vitamins and herbal drugs.  Keep the tablets in a safe place, out of the reach of children.  Keep the tablets in the original packaging and store them at room temperature, away from heat and direct sunlight.  Don't worry if you forget to take your tablet. Do not take a double dose. The levels of the drug in your blood will not change very much, but try not to miss more than one or two tablets in a row.  If your doctor decides to stop the treatment, return any remaining tablets to the pharmacist. Don't flush them down the toilet or throw them away.  Remember to get a new prescription a few weeks before you run out of tablets, and make sure that you have plenty for holidays. References   This information is based on our Anastrozole (Arimidex®) fact sheet and has been compiled using information from a number of reliable sources, including:    erika Cordova. Garcia Martin: The Complete Drug Reference. 36th edition. 2009. [employer]. [facility] III. 59th edition. 2010.  [facility] [employer] [facility] [employer] and [address].  Early and locally advanced breast cancer: diagnosis and treatment. February 2009. National institute for Health and Clinical Excellence (NICE).  electronic Medicines Compendium [facility]). www.medicines. org.uk (accessed September 2010).

## 2022-02-24 NOTE — LETTER
2/27/2022 9:18 AM    Patient:  Epifanio Casey   YOB: 1955  Date of Visit: 2/24/2022      Dear   No Recipients: Thank you for referring Ms. Epifanio Casey to me for evaluation/treatment. Below are the relevant portions of my assessment and plan of care. If you have questions, please do not hesitate to call me. I look forward to following Ms. Wiggins along with you.         Sincerely,      Madelyn Sparrow, DO

## 2022-02-24 NOTE — PROGRESS NOTES
[facility] from [place] requested medical records dated 01/25/2022.  Faxed ov notes to [phone]

## 2022-02-24 NOTE — LETTER
2/27/2022    Patient: Yaquelin Park   YOB: 1955   Date of Visit: 2/24/2022     Anne Bertrand MD  [address] [address]  Via Fax: [phone]    Dear Anne Bertrand MD,      Thank you for referring Ms. Yaquelin Park to [address] [place] [address] for evaluation. My notes for this consultation are attached. If you have questions, please do not hesitate to call me. I look forward to following your patient along with you.       Sincerely,    Patricia Riddle, DO

## 2022-02-24 NOTE — PROGRESS NOTES
Ian Reyes is a 78 y.o. female  Chief Complaint   Patient presents with    New Patient    Breast Cancer     1. Have you been to the ER, urgent care clinic since your last visit? Hospitalized since your last visit? No.    2. Have you seen or consulted any other health care providers outside of the [address] [place] [address] since your last visit? Include any pap smears or colon screening. No.    Patient states she feels as if she a muscle above incision where surgery was done.

## 2022-02-24 NOTE — PROGRESS NOTES
[facility] [place] [address], [place]: [phone]  F: [phone]    Reason for Visit:   Peyman Santana is a 78 y.o. female who is seen in consultation at the request of Dr. Jacki Chaney for evaluation of breast cancer. Treatment History:   Breast biopsy 12/28/21  Lumpectomy 1/25/22      STAGE: PATHOLOGIC STAGE CLASSIFICATION (pTNM, AJCC 8th Edition)       TNM Descriptors: m (multiple foci of invasive carcinoma)       Primary Tumor (pT): pT2       Regional Lymph Nodes Modifier: (sn): [place] node(s) evaluated       Regional Lymph Nodes (pN): pN1mi    SPECIAL STUDIES       Breast Biomarker Testing Performed on Previous Biopsy: Estrogen   Receptor (ER), Progesterone Receptor (PgR), HER2 (by   immunohistochemistry), HER2 (by in situ hybridization), Ki-67           Estrogen Receptor (ER) Status: Positive           Progesterone Receptor (PgR) Status: Positive           HER2 (by immunohistochemistry): Equivocal (Score 2+)           HER2 (by in situ hybridization): Negative (not amplified)           Ki-67 Percentage of Positive Nuclei: 10%     Her2 FISH negative. History of Present Illness:     Pt seen today for office consult for new breast cancer ER+ Her2 negative mammaprint low risk post lumpectomy 1/25/22. Initially pt had abnormal mammo 12/15/21 on LEFT at [facility]. Biopsy IDC ER+ [id number]. Then had breast MRI:  IMPRESSION  1. Left BI-RADS 6, known malignancy. Right BI-RADS 2, benign. 2. LEFT BREAST: Known invasive ductal carcinoma at approximately 2:00 posterior  depth, with a daughter nodule 3 mm away, and a combined measurement of 4.4 x 3.1  x 2.8 cm. Indeterminate left axillary lymph node. Correlate with clinical  examination, ultrasonography and consider sampling if appropriate. No  multicentric disease. Surgical treatment is planned. 3. RIGHT BREAST: No MRI sign of malignancy. No lymphadenopathy.   4. A summary portfolio has been created for reference and is available in PACS. Then had a lumpectomy 22 with IDC 25mm with 1 micromet LN and negative margins. mammaprint low risk . Here today to discuss adjuvant systemic therapy. Pt is having hot flashes/ night sweats due to stopping ERT. Was seeing GYN for this. Having insomnia. Has panic attacks and was on paxil per PCP. Going back to work at [employer]. Feels well today. No fevers/ chills/ chest pain/ SOB/ nausea/ vomiting/diarrhea/ pain/fatigue  FamHx M liver cancer      Past Medical History:   Diagnosis Date    Cancer ([facility] [address] 2021    Left Breast CA    GERD (gastroesophageal reflux disease)     Nausea & vomiting     Psychiatric disorder     h/o panic attacks    Thyroid disease     Partial Thyroidectomy      Past Surgical History:   Procedure Laterality Date    HX BREAST LUMPECTOMY Left 2022    LEFT BREAST ULTRASOUND GUIDED LUMPECTOMY AND LEFT BREAST SENTINEL NODE BIOPSY performed by Forde Osgood, MD at [address] [place] [address]    x2    HX DILATION AND CURETTAGE      HX GI      colonoscopy    HX HEENT  1972    [place] Teeth extraction    HX POLYPECTOMY      [place] BIOPSY OF BREAST, NEEDLE CORE Right       Social History     Tobacco Use    Smoking status: Former Smoker     Packs/day: 0.50     Years: 7.00     Pack years: 3.50     Quit date:      Years since quittin.1    Smokeless tobacco: Never Used   Substance Use Topics    Alcohol use:  Yes     Alcohol/week: 7.0 standard drinks     Types: 7 Glasses of wine per week     Comment: glass of wine nightly      Family History   Problem Relation Age of Onset    Heart Disease Mother     Atrial Fibrillation Mother     Cancer Mother         Liver CA    Hypertension Mother     Elevated Lipids Mother     Cancer Brother         Colon CA    Diabetes Father     Alcohol abuse Father     Anesth Problems Neg Hx      Current Outpatient Medications   Medication Sig    OTHER Bone Builder DS 3 tabs daily    OTHER Brain Support DS 1 daily    OTHER Phytonutrient Multi 2 tabs daily    LORazepam (ATIVAN) 1 mg tablet Take 1 mg by mouth as needed for Anxiety.  glucosam/chond-msm1/C/gill/bor (GLUCOSAMINE-CHOND-MSM COMPLEX PO) 2 tabs daily    famotidine (PEPCID) 10 mg tablet Take 10 mg by mouth every other day.  budesonide (RHINOCORT AQUA) 32 mcg/actuation nasal spray 2 Sprays by Right Nostril route daily.  fexofenadine (ALLEGRA) 180 mg tablet Take  by mouth daily.  ondansetron (ZOFRAN ODT) 4 mg disintegrating tablet Take 1 Tablet by mouth every eight (8) hours as needed for Vomiting or Nausea or Vomiting. No current facility-administered medications for this visit. Allergies   Allergen Reactions    Latex Rash    Sulfa (Sulfonamide Antibiotics) Rash        A complete review of systems was obtained, negative except as described above and as reported on ROS sheet scanned into system. Physical Exam:     Visit Vitals  /84 (BP 1 Location: Right arm, BP Patient Position: Sitting)   Pulse 83   Temp 96.9 °F (36.1 °C) (Temporal)   Ht 5' 3\" (1.6 m)   Wt 163 lb 6.4 oz (74.1 kg)   SpO2 98%   BMI 28.95 kg/m²     ECOG PS: 0  General: No distress  Eyes: PERRLA, anicteric sclerae  HENT: Atraumatic, wearing mask  Neck: Supple  Respiratory: CTAB, normal respiratory effort  CV: Normal rate, regular rhythm, no murmurs, no peripheral edema  GI: Soft, nontender, nondistended, no masses  MS: Normal gait and station. Digits without clubbing or cyanosis. Skin: No rashes, ecchymoses, or petechiae. Normal temperature, turgor, and texture.   Psych: Alert, oriented, appropriate affect, normal judgment/insight  Neuro non focal  Breast lumpectomy scars healing well, no masses appreciated b/l     Results:     Lab Results   Component Value Date/Time    WBC 7.6 01/20/2022 02:28 PM    HGB 13.9 01/20/2022 02:28 PM    HCT 41.4 01/20/2022 02:28 PM    PLATELET 054 29/21/4546 02:28 PM    MCV 95.4 01/20/2022 02:28 PM    ABS. NEUTROPHILS 3.5 01/20/2022 02:28 PM     No results found for: NA, K, CL, CO2, GLU, BUN, CREA, GFRAA, GFRNA, CA, NAPOC, KPOCT, CLPOC, GLUCPOC, IBUN, CREAPOC, ICAI  No results found for: TBILI, ALT, AP, TP, ALB, GLOB      Records reviewed and summarized above. Pathology report(s) reviewed above. Radiology report(s) reviewed above. Assessment:/PLAN     1)  Stage 2 [id number] LEFT breast cancer ER+ Her2 negative mammapring low risk luminal A post lumpectomy 1/22. Records and history reviewed with pt today. Reviewed path and data specifically with pt. Discussed dx, stage and treatment of breast cancer. Reviewed low risk mammaprint. Discussed adjuvant chemo and hormonal therapy options today. Does not need chemo and happy about this. To see Rad/onc. Discussed adjuvant hormonal therapy with AI. The risks and benefits of aromatase inhibitors (anastrozole, letrozole, and exemestane) were discussed in detail and the patient was informed of the following: Risks include the development of painful muscles and joints (arthralgia/myalgia) and bone loss. Muscle and joint pain can be severe but rarely result in any tissue damage; symptoms usually resolve in several weeks when the medication is stopped. Bone loss is common and a bone density test is recommended as a baseline and then yearly to every several years depending on initial results. The risk of fractures is increased by a few percent in patients taking these drugs, but careful monitoring of bone density and using bone protecting agents when indicated can minimize these risks. Unlike tamoxifen there is no increased risk of blood clots or endometrial cancer. AIs can cause or worsen vaginal dryness but women using these drugs should not use vaginal estrogen preparations for these symptoms. AIs can also cause or increase hot flashes. Any other symptoms should be reported.     Pt is open to adjuvant hormonal therapy with anastrozole. Some concern about recently stopped ERT and hot flashes/ hx of chronic panic attacks. Pt will fu with PCP and GYN regarding medicine for both. Pt will start AI post radiation. Will fu with us post XRT. Clinically pt stable today and healthy overall. Fu with us in 3 months  Call any time with questions. 2) hot flashes/ stopped ERT. May need medicine for help. Will fu with GYN/ [facility].     3) hx of panic attacks not bad now. Has ativan prn. Needs to see PCP for this and possible med due to worse from hot flashes. Hx of paxil use in past.   Maybe consider a drug that treats both panic/anxiety and hot flashes. Defer to PCP since chronic problem. 4) Psychosocial. Mood good. Coping well. Has good support. SW support here as needed. Fu here in 2 months  Call if questions    I appreciate the opportunity to participate in Ms. Artis Wiggins's care.     Signed By: Risa Gregory, DO

## 2022-03-08 ENCOUNTER — TELEPHONE (OUTPATIENT)
Dept: ONCOLOGY | Age: 67
End: 2022-03-08

## 2022-03-08 NOTE — TELEPHONE ENCOUNTER
Pt is calling to find out if Dr Ankita Larson ever found a drug suitable for her hot flashes.     CB# is [phone]

## 2022-03-09 ENCOUNTER — TELEPHONE (OUTPATIENT)
Dept: ONCOLOGY | Age: 67
End: 2022-03-09

## 2022-03-09 NOTE — TELEPHONE ENCOUNTER
Follow up call to patient, ID verified X 2. Updated patient that Provider recommends follow up with GYN for assist managing hot flashes/panic attacks associated with stopping ERT. Plans to follow up with Dr. Christianne [facility] for further guidance. Shared with patient that yoga has been of benefit to many with hot flashes, emailed [facility] yoga flyer to patient at [email or web address]    Update to Provider.

## 2022-03-18 ENCOUNTER — HOSPITAL ENCOUNTER (OUTPATIENT)
Dept: RADIATION THERAPY | Age: 67
Discharge: HOME | End: 2022-03-18

## 2022-04-05 ENCOUNTER — HOSPITAL ENCOUNTER (OUTPATIENT)
Dept: RADIATION THERAPY | Age: 67
Discharge: HOME | End: 2022-04-05

## 2022-04-06 ENCOUNTER — HOSPITAL ENCOUNTER (OUTPATIENT)
Dept: RADIATION THERAPY | Age: 67
Discharge: HOME | End: 2022-04-06

## 2022-04-07 ENCOUNTER — HOSPITAL ENCOUNTER (OUTPATIENT)
Dept: RADIATION THERAPY | Age: 67
Discharge: HOME | End: 2022-04-07

## 2022-04-08 ENCOUNTER — HOSPITAL ENCOUNTER (OUTPATIENT)
Dept: RADIATION THERAPY | Age: 67
Discharge: HOME | End: 2022-04-08

## 2022-04-11 ENCOUNTER — HOSPITAL ENCOUNTER (OUTPATIENT)
Dept: RADIATION THERAPY | Age: 67
Discharge: HOME | End: 2022-04-11

## 2022-04-12 ENCOUNTER — HOSPITAL ENCOUNTER (OUTPATIENT)
Dept: RADIATION THERAPY | Age: 67
Discharge: HOME | End: 2022-04-12

## 2022-04-13 ENCOUNTER — HOSPITAL ENCOUNTER (OUTPATIENT)
Dept: RADIATION THERAPY | Age: 67
Discharge: HOME | End: 2022-04-13

## 2022-04-14 ENCOUNTER — HOSPITAL ENCOUNTER (OUTPATIENT)
Dept: RADIATION THERAPY | Age: 67
Discharge: HOME | End: 2022-04-14

## 2022-04-15 ENCOUNTER — HOSPITAL ENCOUNTER (OUTPATIENT)
Dept: RADIATION THERAPY | Age: 67
Discharge: HOME | End: 2022-04-15

## 2022-04-18 ENCOUNTER — HOSPITAL ENCOUNTER (OUTPATIENT)
Dept: RADIATION THERAPY | Age: 67
Discharge: HOME | End: 2022-04-18

## 2022-04-19 ENCOUNTER — HOSPITAL ENCOUNTER (OUTPATIENT)
Dept: RADIATION THERAPY | Age: 67
Discharge: HOME | End: 2022-04-19

## 2022-04-20 ENCOUNTER — HOSPITAL ENCOUNTER (OUTPATIENT)
Dept: RADIATION THERAPY | Age: 67
Discharge: HOME | End: 2022-04-20

## 2022-04-21 ENCOUNTER — HOSPITAL ENCOUNTER (OUTPATIENT)
Dept: RADIATION THERAPY | Age: 67
Discharge: HOME | End: 2022-04-21

## 2022-04-22 ENCOUNTER — HOSPITAL ENCOUNTER (OUTPATIENT)
Dept: RADIATION THERAPY | Age: 67
Discharge: HOME | End: 2022-04-22

## 2022-04-25 ENCOUNTER — HOSPITAL ENCOUNTER (OUTPATIENT)
Dept: RADIATION THERAPY | Age: 67
Discharge: HOME | End: 2022-04-25

## 2022-04-26 ENCOUNTER — HOSPITAL ENCOUNTER (OUTPATIENT)
Dept: RADIATION THERAPY | Age: 67
Discharge: HOME | End: 2022-04-26

## 2022-04-27 ENCOUNTER — HOSPITAL ENCOUNTER (OUTPATIENT)
Dept: RADIATION THERAPY | Age: 67
Discharge: HOME | End: 2022-04-27

## 2022-04-28 ENCOUNTER — HOSPITAL ENCOUNTER (OUTPATIENT)
Dept: RADIATION THERAPY | Age: 67
Discharge: HOME | End: 2022-04-28

## 2022-04-29 ENCOUNTER — HOSPITAL ENCOUNTER (OUTPATIENT)
Dept: RADIATION THERAPY | Age: 67
Discharge: HOME | End: 2022-04-29

## 2022-05-02 ENCOUNTER — HOSPITAL ENCOUNTER (OUTPATIENT)
Dept: RADIATION THERAPY | Age: 67
Discharge: HOME | End: 2022-05-02

## 2022-05-03 ENCOUNTER — HOSPITAL ENCOUNTER (OUTPATIENT)
Dept: RADIATION THERAPY | Age: 67
Discharge: HOME | End: 2022-05-03

## 2022-05-04 ENCOUNTER — HOSPITAL ENCOUNTER (OUTPATIENT)
Dept: RADIATION THERAPY | Age: 67
Discharge: HOME | End: 2022-05-04

## 2022-05-05 ENCOUNTER — HOSPITAL ENCOUNTER (OUTPATIENT)
Dept: RADIATION THERAPY | Age: 67
Discharge: HOME | End: 2022-05-05

## 2022-05-06 ENCOUNTER — HOSPITAL ENCOUNTER (OUTPATIENT)
Dept: RADIATION THERAPY | Age: 67
Discharge: HOME | End: 2022-05-06

## 2022-05-09 ENCOUNTER — HOSPITAL ENCOUNTER (OUTPATIENT)
Dept: RADIATION THERAPY | Age: 67
Discharge: HOME | End: 2022-05-09

## 2022-05-10 ENCOUNTER — HOSPITAL ENCOUNTER (OUTPATIENT)
Dept: RADIATION THERAPY | Age: 67
Discharge: HOME | End: 2022-05-10

## 2022-05-11 ENCOUNTER — HOSPITAL ENCOUNTER (OUTPATIENT)
Dept: RADIATION THERAPY | Age: 67
Discharge: HOME | End: 2022-05-11

## 2022-05-12 ENCOUNTER — HOSPITAL ENCOUNTER (OUTPATIENT)
Dept: RADIATION THERAPY | Age: 67
Discharge: HOME | End: 2022-05-12

## 2022-05-13 ENCOUNTER — HOSPITAL ENCOUNTER (OUTPATIENT)
Dept: RADIATION THERAPY | Age: 67
Discharge: HOME | End: 2022-05-13

## 2022-05-16 ENCOUNTER — HOSPITAL ENCOUNTER (OUTPATIENT)
Dept: RADIATION THERAPY | Age: 67
Discharge: HOME | End: 2022-05-16

## 2022-05-17 ENCOUNTER — HOSPITAL ENCOUNTER (OUTPATIENT)
Dept: RADIATION THERAPY | Age: 67
Discharge: HOME | End: 2022-05-17

## 2022-05-18 ENCOUNTER — HOSPITAL ENCOUNTER (OUTPATIENT)
Dept: RADIATION THERAPY | Age: 67
Discharge: HOME | End: 2022-05-18

## 2022-05-19 ENCOUNTER — HOSPITAL ENCOUNTER (OUTPATIENT)
Dept: RADIATION THERAPY | Age: 67
Discharge: HOME | End: 2022-05-19

## 2022-05-22 NOTE — PROGRESS NOTES
[facility] [place] [address] [address], [address], [place]: [phone]  F: [phone]    Reason for Visit:   Amaury Zarate is a 78 y.o. female who is seen for fu breast cancer. Treatment History:   Breast biopsy 12/28/21  Lumpectomy 1/25/22  Radiation done 5/22  For AI. STAGE: PATHOLOGIC STAGE CLASSIFICATION (pTNM, AJCC 8th Edition)       TNM Descriptors: m (multiple foci of invasive carcinoma)       Primary Tumor (pT): pT2       Regional Lymph Nodes Modifier: (sn): [place] node(s) evaluated       Regional Lymph Nodes (pN): pN1mi    SPECIAL STUDIES       Breast Biomarker Testing Performed on Previous Biopsy: Estrogen   Receptor (ER), Progesterone Receptor (PgR), HER2 (by   immunohistochemistry), HER2 (by in situ hybridization), Ki-67           Estrogen Receptor (ER) Status: Positive           Progesterone Receptor (PgR) Status: Positive           HER2 (by immunohistochemistry): Equivocal (Score 2+)           HER2 (by in situ hybridization): Negative (not amplified)           Ki-67 Percentage of Positive Nuclei: 10%     Her2 FISH negative. History of Present Illness:     Pt seen today for office fu for breast cancer. Done radiation   For AI. Feels fine. No fevers/ chills/ chest pain/ SOB/ nausea/ vomiting/diarrhea/ pain/fatigue          Last visit: consult for new breast cancer ER+ Her2 negative mammaprint low risk post lumpectomy 1/25/22. Initially pt had abnormal mammo 12/15/21 on LEFT at [address] Biopsy IDC ER+ [id number]. Then had breast MRI:  IMPRESSION  1. Left BI-RADS 6, known malignancy. Right BI-RADS 2, benign. 2. LEFT BREAST: Known invasive ductal carcinoma at approximately 2:00 posterior  depth, with a daughter nodule 3 mm away, and a combined measurement of 4.4 x 3.1  x 2.8 cm. Indeterminate left axillary lymph node. Correlate with clinical  examination, ultrasonography and consider sampling if appropriate. No  multicentric disease. Surgical treatment is planned. 3. RIGHT BREAST: No MRI sign of malignancy. No lymphadenopathy. 4. A summary portfolio has been created for reference and is available in PACS. Then had a lumpectomy 22 with IDC 25mm with 1 micromet LN and negative margins. mammaprint low risk . Here today to discuss adjuvant systemic therapy. Pt is having hot flashes/ night sweats due to stopping ERT. Was seeing GYN for this. Having insomnia. Has panic attacks and was on paxil per PCP. Going back to work at [employer]. Feels well today. No fevers/ chills/ chest pain/ SOB/ nausea/ vomiting/diarrhea/ pain/fatigue  FamHx M liver cancer      Past Medical History:   Diagnosis Date    Cancer ([address] 2021    Left Breast CA    GERD (gastroesophageal reflux disease)     Nausea & vomiting     Psychiatric disorder     h/o panic attacks    Thyroid disease     Partial Thyroidectomy      Past Surgical History:   Procedure Laterality Date    HX BREAST LUMPECTOMY Left 2022    LEFT BREAST ULTRASOUND GUIDED LUMPECTOMY AND LEFT BREAST SENTINEL NODE BIOPSY performed by Candida Rios MD at [address]    x2    HX DILATION AND CURETTAGE      HX GI      colonoscopy    HX HEENT  1972    [place] Teeth extraction    HX POLYPECTOMY      [place] BIOPSY OF BREAST, NEEDLE CORE Right       Social History     Tobacco Use    Smoking status: Former Smoker     Packs/day: 0.50     Years: 7.00     Pack years: 3.50     Quit date:      Years since quittin.4    Smokeless tobacco: Never Used   Substance Use Topics    Alcohol use:  Yes     Alcohol/week: 7.0 standard drinks     Types: 7 Glasses of wine per week     Comment: glass of wine nightly      Family History   Problem Relation Age of Onset    Heart Disease Mother     Atrial Fibrillation Mother     Cancer Mother         Liver CA    Hypertension Mother     Elevated Lipids Mother     Cancer Brother         Colon CA    Diabetes Father     Alcohol abuse Father     Anesth Problems Neg Hx      Current Outpatient Medications   Medication Sig    venlafaxine-SR (EFFEXOR-XR) 37.5 mg capsule Take 37.5 mg by mouth daily.  OTHER Bone Builder DS 3 tabs daily    OTHER Brain Support DS 1 daily    OTHER Phytonutrient Multi 2 tabs daily    LORazepam (ATIVAN) 1 mg tablet Take 1 mg by mouth as needed for Anxiety.  glucosam/chond-msm1/C/gill/bor (GLUCOSAMINE-CHOND-MSM COMPLEX PO) 2 tabs daily    famotidine (PEPCID) 10 mg tablet Take 10 mg by mouth every other day.  budesonide (RHINOCORT AQUA) 32 mcg/actuation nasal spray 2 Sprays by Right Nostril route daily.  fexofenadine (ALLEGRA) 180 mg tablet Take  by mouth daily.  ondansetron (ZOFRAN ODT) 4 mg disintegrating tablet Take 1 Tablet by mouth every eight (8) hours as needed for Vomiting or Nausea or Vomiting. No current facility-administered medications for this visit. Allergies   Allergen Reactions    Latex Rash    Sulfa (Sulfonamide Antibiotics) Rash        A complete review of systems was obtained, negative except as described above and as reported on ROS sheet scanned into system. Physical Exam:     Visit Vitals  /70 (BP 1 Location: Left upper arm, BP Patient Position: Sitting) Comment: wait   Pulse 79   Temp 97 °F (36.1 °C) (Temporal)   Ht 5' 3\" (1.6 m)   Wt 159 lb 6.4 oz (72.3 kg)   SpO2 96%   BMI 28.24 kg/m²     ECOG PS: 0  General: No distress  Eyes:  anicteric sclerae  HENT: Atraumatic, wearing mask  Neck: Supple  Respiratory: CTAB, normal respiratory effort  CV: Normal rate, regular rhythm  GI: Soft, nontender, nondistended, no masses  MS: Normal gait and station. Digits without clubbing or cyanosis. Skin: No rashes, ecchymoses, or petechiae. Normal temperature, turgor, and texture.   Psych: Alert, oriented, appropriate affect, normal judgment/insight  Neuro non focal  Breast lumpectomy with post XRT skin changes healing well, no masses appreciated b/l     Results:     Lab Results   Component Value Date/Time    WBC 7.6 01/20/2022 02:28 PM    HGB 13.9 01/20/2022 02:28 PM    HCT 41.4 01/20/2022 02:28 PM    PLATELET 169 93/69/1577 02:28 PM    MCV 95.4 01/20/2022 02:28 PM    ABS. NEUTROPHILS 3.5 01/20/2022 02:28 PM     No results found for: NA, K, CL, CO2, GLU, BUN, CREA, GFRAA, GFRNA, CA, NAPOC, KPOCT, CLPOC, GLUCPOC, IBUN, CREAPOC, ICAI  No results found for: TBILI, ALT, AP, TP, ALB, GLOB      Records reviewed and summarized above. Pathology report(s) reviewed above. Radiology report(s) reviewed above. Assessment:/PLAN     1)  Stage 2 [id number] LEFT breast cancer ER+ Her2 negative mammapring low risk luminal A post lumpectomy 1/22. Reviewed low risk mammaprint. Does not need chemo      Seen today for fu. Finished breast radiation last week  Skin is healing. Pt is here to set up adjuvant hormonal therapy with AI. The risks and benefits of aromatase inhibitors (anastrozole, letrozole, and exemestane) were discussed in detail and the patient was informed of the following: Risks include the development of painful muscles and joints (arthralgia/myalgia) and bone loss. Muscle and joint pain can be severe but rarely result in any tissue damage; symptoms usually resolve in several weeks when the medication is stopped. Bone loss is common and a bone density test is recommended as a baseline and then yearly to every several years depending on initial results. The risk of fractures is increased by a few percent in patients taking these drugs, but careful monitoring of bone density and using bone protecting agents when indicated can minimize these risks. Unlike tamoxifen there is no increased risk of blood clots or endometrial cancer. AIs can cause or worsen vaginal dryness but women using these drugs should not use vaginal estrogen preparations for these symptoms. AIs can also cause or increase hot flashes.  Any other symptoms should be reported. Pt will start anastrozole. Ordered today. Clinically pt stable today and healthy overall. Fu with us in 3 months  Call any time with questions. 2) hot flashes/ stopped ERT. On effexor via GYN. May want to increase dose. Will fu with GYN/ [facility].     3) hx of panic attacks not bad now. Per PCP      4) Psychosocial. Mood good. Coping well. Has good support. SW support here as needed. Fu here in 3 months  Call if questions    I appreciate the opportunity to participate in Ms. Mauro Wiggins's care.     Signed By: Holly Ruiz, DO

## 2022-05-23 ENCOUNTER — OFFICE VISIT (OUTPATIENT)
Dept: ONCOLOGY | Age: 67
End: 2022-05-23
Payer: COMMERCIAL

## 2022-05-23 VITALS
HEART RATE: 79 BPM | BODY MASS INDEX: 28.24 KG/M2 | TEMPERATURE: 97 F | WEIGHT: 159.4 LBS | HEIGHT: 63 IN | SYSTOLIC BLOOD PRESSURE: 110 MMHG | DIASTOLIC BLOOD PRESSURE: 70 MMHG | OXYGEN SATURATION: 96 %

## 2022-05-23 DIAGNOSIS — Z98.890 HISTORY OF LUMPECTOMY OF LEFT BREAST: ICD-10-CM

## 2022-05-23 DIAGNOSIS — C50.912 MALIGNANT NEOPLASM OF LEFT BREAST IN FEMALE, ESTROGEN RECEPTOR POSITIVE, UNSPECIFIED SITE OF BREAST (HCC): Primary | ICD-10-CM

## 2022-05-23 DIAGNOSIS — Z17.0 MALIGNANT NEOPLASM OF LEFT BREAST IN FEMALE, ESTROGEN RECEPTOR POSITIVE, UNSPECIFIED SITE OF BREAST (HCC): Primary | ICD-10-CM

## 2022-05-23 PROCEDURE — 99214 OFFICE O/P EST MOD 30 MIN: CPT | Performed by: INTERNAL MEDICINE

## 2022-05-23 RX ORDER — VENLAFAXINE HYDROCHLORIDE 37.5 MG/1
37.5 CAPSULE, EXTENDED RELEASE ORAL DAILY
COMMUNITY
Start: 2022-03-22

## 2022-05-23 NOTE — PROGRESS NOTES
Shana Zimmer is a 78 y.o. female  Chief Complaint   Patient presents with    Follow-up    Breast Cancer   1. Have you been to the ER, urgent care clinic since your last visit? Hospitalized since your last visit? No.    2. Have you seen or consulted any other health care providers outside of the [address] [place] [address] since your last visit? Include any pap smears or colon screening.  No.

## 2022-05-23 NOTE — LETTER
5/23/2022    Patient: Yuli Munson   YOB: 1955   Date of Visit: 5/23/2022     Rita Fischer MD  [address] [address]  Via Fax: [phone]    Dear Rita Fischer MD,      Thank you for referring Ms. Yuli Munson to [address] [place] [address] for evaluation. My notes for this consultation are attached. If you have questions, please do not hesitate to call me. I look forward to following your patient along with you.       Sincerely,    Renato Arora, DO

## 2022-05-24 ENCOUNTER — TELEPHONE (OUTPATIENT)
Dept: ONCOLOGY | Age: 67
End: 2022-05-24

## 2022-05-24 DIAGNOSIS — Z79.811 USE OF ANASTROZOLE (ARIMIDEX): ICD-10-CM

## 2022-05-24 DIAGNOSIS — Z17.0 MALIGNANT NEOPLASM OF LEFT BREAST IN FEMALE, ESTROGEN RECEPTOR POSITIVE, UNSPECIFIED SITE OF BREAST (HCC): Primary | ICD-10-CM

## 2022-05-24 DIAGNOSIS — C50.912 MALIGNANT NEOPLASM OF LEFT BREAST IN FEMALE, ESTROGEN RECEPTOR POSITIVE, UNSPECIFIED SITE OF BREAST (HCC): Primary | ICD-10-CM

## 2022-05-24 RX ORDER — ANASTROZOLE 1 MG/1
1 TABLET ORAL DAILY
Qty: 90 TABLET | Refills: 3 | Status: SHIPPED | OUTPATIENT
Start: 2022-05-24

## 2022-05-24 NOTE — TELEPHONE ENCOUNTER
ALBAA Verified. Called pt on mobile phone number listed. Patient was made aware of her Anastrozole being sent to her pharmacy today! She was appreciative over update.   Maude Peraza

## 2022-05-24 NOTE — TELEPHONE ENCOUNTER
Pt was seen yesterday and was expecting a rx to be sent to [facility] on [address] (pt is unsure on what med). Pt called [facility] to check on the status and nothing has been received. Please advise.     CB# is [phone]

## 2022-06-06 NOTE — PROGRESS NOTES
HISTORY OF PRESENT ILLNESS  Blair Modi is a 78 y.o. female. HPI Established patient presents for follow-up for LEFT breast cancer. Denies breast mass, skin changes, nipple discharge and pain. Breast history -   Referring - Dr. Vinny [address] - [address]  2012- History of right breast biopsy, benign  Mammogram, 12/27/21, BIRADS 4C, 2.5 cm irregular mass with indistinct margins at 1:00  12/28/21- US guided biopsy of LEFT breast, LEFT IDC, grd 2, ER %, KS %, HER2 equivocal, FISH negative, ki-67 20%  1/25/22 - LEFT breast lumpectomy and SLNB - Dr. Barbra Marshall  2.5cm IDC, 1/4 nodes positive - T2N1mi  Low risk Mammaprint  5/19/22 - completed XRT - Dr. Mary Kay Ramsay   5/2022 - start anastrozole - Dr. Mk Ma history -   Brother - colon cancer at 70      OB History    No obstetric history on file. Obstetric Comments   Menarche 14, LMP 53, # of children 2, age of 4st delivery 31, Hysterectomy/oophorectomy No/No, Breast bx Yes, history of breast feeding Yes, BCP Yes, Hormone therapy Yes               Past Surgical History:   Procedure Laterality Date    HX BREAST LUMPECTOMY Left 1/25/2022    LEFT BREAST ULTRASOUND GUIDED LUMPECTOMY AND LEFT BREAST SENTINEL NODE BIOPSY performed by Lovey Soulier, MD at [address], 1983    x2    HX DILATION AND CURETTAGE  2011    HX GI      colonoscopy    HX HEENT  1972    [place] Teeth extraction    HX POLYPECTOMY      [place] BIOPSY OF BREAST, NEEDLE CORE Right 2011               ROS    Physical Exam  Constitutional:       Appearance: Normal appearance. Chest:   Breasts:      Right: No mass, nipple discharge, skin change, tenderness, axillary adenopathy or supraclavicular adenopathy. Left: No mass, nipple discharge, skin change (well healed incisions to breast and axilla; post XRT skin changes), tenderness, axillary adenopathy or supraclavicular adenopathy.        Musculoskeletal:      Comments: FROM - UE x 2 Lymphadenopathy:      Upper Body:      Right upper body: No supraclavicular or axillary adenopathy. Left upper body: No supraclavicular or axillary adenopathy. Neurological:      Mental Status: She is alert. Psychiatric:         Attention and Perception: Attention normal.         Mood and Affect: Mood normal.         Speech: Speech normal.         Behavior: Behavior normal.         Visit Vitals  Ht 5' 3\" (1.6 m)   Wt 159 lb (72.1 kg)   BMI 28.17 kg/m²         ASSESSMENT and PLAN    ICD-10-CM ICD-9-CM    1. Malignant neoplasm of upper-outer quadrant of left breast in female, estrogen receptor positive (HCC)  C50.412 174.4     Z17.0 V86.0    2. S/P lumpectomy of breast  Z98.890 V45.89    3. S/P radiation therapy  Z92.3 V66.1    4. History of breast cancer in female  Z80.3 V10.3 JORDIN 3D RANDI W MAMMO BI DX INCL CAD        Normal exam with no evidence of local recurrence. Completed XRT last month. Skin healing well. BDmammogram 3D in 11/2022. RTC in 6 months or sooner PRN. Will work to space out appointments from there. She is comfortable with this plan. All questions answered and she stated understanding. Total time spent for this patient - 20 minutes.

## 2022-06-07 ENCOUNTER — OFFICE VISIT (OUTPATIENT)
Dept: SURGERY | Age: 67
End: 2022-06-07
Payer: COMMERCIAL

## 2022-06-07 VITALS — HEIGHT: 63 IN | WEIGHT: 159 LBS | BODY MASS INDEX: 28.17 KG/M2

## 2022-06-07 DIAGNOSIS — Z98.890 S/P LUMPECTOMY OF BREAST: ICD-10-CM

## 2022-06-07 DIAGNOSIS — Z92.3 S/P RADIATION THERAPY: ICD-10-CM

## 2022-06-07 DIAGNOSIS — Z85.3 HISTORY OF BREAST CANCER IN FEMALE: ICD-10-CM

## 2022-06-07 DIAGNOSIS — C50.412 MALIGNANT NEOPLASM OF UPPER-OUTER QUADRANT OF LEFT BREAST IN FEMALE, ESTROGEN RECEPTOR POSITIVE (HCC): Primary | ICD-10-CM

## 2022-06-07 DIAGNOSIS — Z17.0 MALIGNANT NEOPLASM OF UPPER-OUTER QUADRANT OF LEFT BREAST IN FEMALE, ESTROGEN RECEPTOR POSITIVE (HCC): Primary | ICD-10-CM

## 2022-06-07 PROCEDURE — 99213 OFFICE O/P EST LOW 20 MIN: CPT | Performed by: NURSE PRACTITIONER

## 2022-06-07 PROCEDURE — 1123F ACP DISCUSS/DSCN MKR DOCD: CPT | Performed by: NURSE PRACTITIONER

## 2022-07-28 ENCOUNTER — HOSPITAL ENCOUNTER (OUTPATIENT)
Dept: RADIATION THERAPY | Age: 67
Discharge: HOME | End: 2022-07-28

## 2022-08-10 ENCOUNTER — OFFICE VISIT (OUTPATIENT)
Dept: ONCOLOGY | Age: 67
End: 2022-08-10
Payer: COMMERCIAL

## 2022-08-10 VITALS
HEART RATE: 84 BPM | DIASTOLIC BLOOD PRESSURE: 81 MMHG | BODY MASS INDEX: 26.91 KG/M2 | SYSTOLIC BLOOD PRESSURE: 114 MMHG | TEMPERATURE: 97.4 F | HEIGHT: 63 IN | OXYGEN SATURATION: 95 % | WEIGHT: 151.9 LBS

## 2022-08-10 DIAGNOSIS — C50.912 MALIGNANT NEOPLASM OF LEFT BREAST IN FEMALE, ESTROGEN RECEPTOR POSITIVE, UNSPECIFIED SITE OF BREAST (HCC): Primary | ICD-10-CM

## 2022-08-10 DIAGNOSIS — Z17.0 MALIGNANT NEOPLASM OF LEFT BREAST IN FEMALE, ESTROGEN RECEPTOR POSITIVE, UNSPECIFIED SITE OF BREAST (HCC): Primary | ICD-10-CM

## 2022-08-10 DIAGNOSIS — Z98.890 HISTORY OF LUMPECTOMY OF LEFT BREAST: ICD-10-CM

## 2022-08-10 PROCEDURE — 1123F ACP DISCUSS/DSCN MKR DOCD: CPT | Performed by: INTERNAL MEDICINE

## 2022-08-10 PROCEDURE — 99213 OFFICE O/P EST LOW 20 MIN: CPT | Performed by: INTERNAL MEDICINE

## 2022-08-10 RX ORDER — ANASTROZOLE 1 MG/1
1 TABLET ORAL DAILY
Qty: 90 TABLET | Refills: 3 | Status: SHIPPED | OUTPATIENT
Start: 2022-08-10

## 2022-08-10 NOTE — PROGRESS NOTES
Jose Giordano is a 78 y.o. female  Chief Complaint   Patient presents with    Follow-up    Breast Cancer     1. Have you been to the ER, urgent care clinic since your last visit? Hospitalized since your last visit? No    2. Have you seen or consulted any other health care providers outside of the [address] [place] [address] since your last visit? Include any pap smears or colon screening. Patient states she has been having swollen collarbone but has no pain Looks like a lump under skin. Statement Selected

## 2022-08-10 NOTE — LETTER
8/10/2022    Patient: Maura Flynn   YOB: 1955   Date of Visit: 8/10/2022     Vanessa Pierson MD  [address] [address]  Via Fax: [phone]    Dear Vanessa Pierson MD,      Thank you for referring Ms. Maura Flynn to [address] [place] [address] for evaluation. My notes for this consultation are attached. If you have questions, please do not hesitate to call me. I look forward to following your patient along with you.       Sincerely,    Colt Reese, DO

## 2022-08-10 NOTE — PROGRESS NOTES
[facility] [place] [facility]  [address] [place], [place]: [phone]  F: [phone]    Reason for Visit:   Ghazala Prasad is a 78 y.o. female who is seen for fu breast cancer. Treatment History:   Breast biopsy 12/28/21  Lumpectomy 1/25/22  Radiation done 5/22  started AI 6/22. STAGE: PATHOLOGIC STAGE CLASSIFICATION (pTNM, AJCC 8th Edition)       TNM Descriptors: m (multiple foci of invasive carcinoma)       Primary Tumor (pT): pT2       Regional Lymph Nodes Modifier: (sn): [place] node(s) evaluated       Regional Lymph Nodes (pN): pN1mi    SPECIAL STUDIES       Breast Biomarker Testing Performed on Previous Biopsy: Estrogen   Receptor (ER), Progesterone Receptor (PgR), HER2 (by   immunohistochemistry), HER2 (by in situ hybridization), Ki-67           Estrogen Receptor (ER) Status: Positive           Progesterone Receptor (PgR) Status: Positive           HER2 (by immunohistochemistry): Equivocal (Score 2+)           HER2 (by in situ hybridization): Negative (not amplified)           Ki-67 Percentage of Positive Nuclei: 10%     Her2 FISH negative. History of Present Illness:     Pt seen today for office fu for breast cancer. Doing well overall. Tolerating AI fine with hot flashes. Also up antidepressant for hot flashes. Feels fine. Moving to [place]. No fevers/ chills/ chest pain/ SOB/ nausea/ vomiting/diarrhea/ pain/fatigue          Last visit: consult for new breast cancer ER+ Her2 negative mammaprint low risk post lumpectomy 1/25/22. Initially pt had abnormal mammo 12/15/21 on LEFT at [address] Biopsy IDC ER+ [id number]. Then had breast MRI:  IMPRESSION  1. Left BI-RADS 6, known malignancy. Right BI-RADS 2, benign. 2. LEFT BREAST: Known invasive ductal carcinoma at approximately 2:00 posterior  depth, with a daughter nodule 3 mm away, and a combined measurement of 4.4 x 3.1  x 2.8 cm. Indeterminate left axillary lymph node.  Correlate with clinical  examination, ultrasonography and consider sampling if appropriate. No  multicentric disease. Surgical treatment is planned. 3. RIGHT BREAST: No MRI sign of malignancy. No lymphadenopathy. 4. A summary portfolio has been created for reference and is available in PACS. Then had a lumpectomy 22 with IDC 25mm with 1 micromet LN and negative margins. mammaprint low risk . Here today to discuss adjuvant systemic therapy. Pt is having hot flashes/ night sweats due to stopping ERT. Was seeing GYN for this. Having insomnia. Has panic attacks and was on paxil per PCP. Going back to work at [employer]. Feels well today. No fevers/ chills/ chest pain/ SOB/ nausea/ vomiting/diarrhea/ pain/fatigue  FamHx M liver cancer      Past Medical History:   Diagnosis Date    Cancer ([address] 2021    Left Breast CA    GERD (gastroesophageal reflux disease)     Nausea & vomiting     Psychiatric disorder     h/o panic attacks    Thyroid disease     Partial Thyroidectomy      Past Surgical History:   Procedure Laterality Date    HX BREAST LUMPECTOMY Left 2022    LEFT BREAST ULTRASOUND GUIDED LUMPECTOMY AND LEFT BREAST SENTINEL NODE BIOPSY performed by Louise eHss MD at [facility] [address]    [address], 1983    x2    HX DILATION AND CURETTAGE      HX GI      colonoscopy    HX HEENT  1972    [place] Teeth extraction    HX POLYPECTOMY      ME BIOPSY OF BREAST, NEEDLE CORE Right       Social History     Tobacco Use    Smoking status: Former     Packs/day: 0.50     Years: 7.00     Pack years: 3.50     Types: Cigarettes     Quit date:      Years since quittin.6    Smokeless tobacco: Never   Substance Use Topics    Alcohol use:  Yes     Alcohol/week: 7.0 standard drinks     Types: 7 Glasses of wine per week     Comment: glass of wine nightly      Family History   Problem Relation Age of Onset    Heart Disease Mother     Atrial Fibrillation Mother     Cancer Mother         Liver CA    Hypertension Mother     Elevated Lipids Mother     Cancer Brother         Colon CA    Diabetes Father     Alcohol abuse Father     Anesth Problems Neg Hx      Current Outpatient Medications   Medication Sig    anastrozole (ARIMIDEX) 1 mg tablet Take 1 mg by mouth daily. venlafaxine-SR (EFFEXOR-XR) 37.5 mg capsule Take 37.5 mg by mouth daily. OTHER Bone Builder DS 3 tabs daily    OTHER Brain Support DS 1 daily    OTHER Phytonutrient Multi 2 tabs daily    LORazepam (ATIVAN) 1 mg tablet Take 1 mg by mouth as needed for Anxiety. glucosam/chond-msm1/C/gill/bor (GLUCOSAMINE-CHOND-MSM COMPLEX PO) 2 tabs daily    famotidine (PEPCID) 10 mg tablet Take 10 mg by mouth every other day. budesonide (RHINOCORT AQUA) 32 mcg/actuation nasal spray 2 Sprays by Right Nostril route daily. fexofenadine (ALLEGRA) 180 mg tablet Take  by mouth daily. ondansetron (ZOFRAN ODT) 4 mg disintegrating tablet Take 1 Tablet by mouth every eight (8) hours as needed for Vomiting or Nausea or Vomiting. No current facility-administered medications for this visit. Allergies   Allergen Reactions    Latex Rash    Sulfa (Sulfonamide Antibiotics) Rash        A complete review of systems was obtained, negative except as described above and as reported on ROS sheet scanned into system. Physical Exam:     Visit Vitals  /81 (BP 1 Location: Left upper arm, BP Patient Position: Sitting)   Pulse 84   Temp 97.4 °F (36.3 °C) (Temporal)   Ht 5' 3\" (1.6 m)   Wt 151 lb 14.4 oz (68.9 kg)   SpO2 95%   BMI 26.91 kg/m²     ECOG PS: 0  General: No distress  Eyes:  anicteric sclerae  HENT: Atraumatic, wearing mask  Neck: Supple  Respiratory: CTAB, normal respiratory effort  CV: Normal rate, regular rhythm  GI: Soft, nontender, nondistended, no masses  MS: Normal gait and station. Digits without clubbing or cyanosis. Skin: No rashes, ecchymoses, or petechiae. Normal temperature, turgor, and texture.   Psych: Alert, oriented, appropriate affect, normal judgment/insight  Neuro non focal  Breast  no masses appreciated b/l     Results:     Lab Results   Component Value Date/Time    WBC 7.6 01/20/2022 02:28 PM    HGB 13.9 01/20/2022 02:28 PM    HCT 41.4 01/20/2022 02:28 PM    PLATELET 808 78/42/0298 02:28 PM    MCV 95.4 01/20/2022 02:28 PM    ABS. NEUTROPHILS 3.5 01/20/2022 02:28 PM     No results found for: NA, K, CL, CO2, GLU, BUN, CREA, GFRAA, GFRNA, CA, NAPOC, KPOCT, CLPOC, GLUCPOC, IBUN, CREAPOC, ICAI  No results found for: TBILI, ALT, AP, TP, ALB, GLOB      Records reviewed and summarized above. Pathology report(s) reviewed above. Radiology report(s) reviewed above. Assessment:/PLAN     1)  Stage 2 [id number] LEFT breast cancer ER+ Her2 negative mammapring low risk luminal A post lumpectomy 1/22. Reviewed low risk mammaprint. Does not need chemo    Did breast radiation     Seen today for fu. On adjuvant hormonal therapy with anastrozole. Tolerating AI fine. Labs and routine HM with PCP. Mammo 11/22 with surgery visit. Clinically pt stable today and healthy overall. Retiring again and moving to [place]. Fu with us in 3 months     2) hot flashes/ stopped ERT. On effexor via GYN. increased dose. Will fu with GYN/ [facility].     3) hx of panic attacks not bad now. Per PCP      4) Psychosocial. Mood good. Coping well. Has good support. SW support here as needed. Fu here in 3 months  Call if questions    I appreciate the opportunity to participate in Ms. Elia Wiggins's care.     Signed By: Kate Dandy, DO

## 2022-11-08 ENCOUNTER — OFFICE VISIT (OUTPATIENT)
Dept: ONCOLOGY | Age: 67
End: 2022-11-08
Payer: COMMERCIAL

## 2022-11-08 VITALS
DIASTOLIC BLOOD PRESSURE: 72 MMHG | HEART RATE: 85 BPM | BODY MASS INDEX: 26.9 KG/M2 | TEMPERATURE: 97.2 F | HEIGHT: 63 IN | SYSTOLIC BLOOD PRESSURE: 116 MMHG | WEIGHT: 151.8 LBS | OXYGEN SATURATION: 95 %

## 2022-11-08 DIAGNOSIS — Z17.0 MALIGNANT NEOPLASM OF LEFT BREAST IN FEMALE, ESTROGEN RECEPTOR POSITIVE, UNSPECIFIED SITE OF BREAST (HCC): Primary | ICD-10-CM

## 2022-11-08 DIAGNOSIS — C50.912 MALIGNANT NEOPLASM OF LEFT BREAST IN FEMALE, ESTROGEN RECEPTOR POSITIVE, UNSPECIFIED SITE OF BREAST (HCC): Primary | ICD-10-CM

## 2022-11-08 DIAGNOSIS — Z98.890 HISTORY OF LUMPECTOMY OF LEFT BREAST: ICD-10-CM

## 2022-11-08 PROCEDURE — G0463 HOSPITAL OUTPT CLINIC VISIT: HCPCS | Performed by: INTERNAL MEDICINE

## 2022-11-08 NOTE — LETTER
11/8/2022    Patient: Meg Jj   YOB: 1955   Date of Visit: 11/8/2022     Lashaun Rosales MD  [address] [address]  Via Fax: [phone]    Dear Lashaun Rosales MD,      Thank you for referring Ms. Meg Jj to [address] [place] [address] for evaluation. My notes for this consultation are attached. If you have questions, please do not hesitate to call me. I look forward to following your patient along with you.       Sincerely,    Bailey Salmeron, DO

## 2022-11-08 NOTE — PROGRESS NOTES
Tena Saeed is a 78 y.o. female    Chief Complaint   Patient presents with    Follow-up    Breast Cancer     1. Have you been to the ER, urgent care clinic since your last visit? Hospitalized since your last visit? No    2. Have you seen or consulted any other health care providers outside of the [address] [place] [address] since your last visit? Include any pap smears or colon screening. Yes, had mamm[facility]. Patient states she has been experiencing new joint pain in her hands that started in September 2022.

## 2022-11-08 NOTE — PROGRESS NOTES
[facility] [place] at [address] [address] [address], [place]: [phone]  F: [phone]    Reason for Visit:   Emily Gonzalez is a 78 y.o. female who is seen for fu breast cancer. Treatment History:   Breast biopsy 12/28/21  Lumpectomy 1/25/22  Radiation done 5/22  started AI 6/22. STAGE: PATHOLOGIC STAGE CLASSIFICATION (pTNM, AJCC 8th Edition)       TNM Descriptors: m (multiple foci of invasive carcinoma)       Primary Tumor (pT): pT2       Regional Lymph Nodes Modifier: (sn): [place] node(s) evaluated       Regional Lymph Nodes (pN): pN1mi    SPECIAL STUDIES       Breast Biomarker Testing Performed on Previous Biopsy: Estrogen   Receptor (ER), Progesterone Receptor (PgR), HER2 (by   immunohistochemistry), HER2 (by in situ hybridization), Ki-67           Estrogen Receptor (ER) Status: Positive           Progesterone Receptor (PgR) Status: Positive           HER2 (by immunohistochemistry): Equivocal (Score 2+)           HER2 (by in situ hybridization): Negative (not amplified)           Ki-67 Percentage of Positive Nuclei: 10%     Her2 FISH negative. History of Present Illness:     Pt seen today for office fu for breast cancer. Doing well overall. Tolerating AI fine with hot flashes and arthralgia. Moved to [place] but come here for family.     No fevers/ chills/ chest pain/ SOB/ nausea/ vomiting/diarrhea/ pain/fatigue      Past Medical History:   Diagnosis Date    Cancer ([facility] 12/2021    Left Breast CA    GERD (gastroesophageal reflux disease)     Nausea & vomiting     Psychiatric disorder     h/o panic attacks    Thyroid disease     Partial Thyroidectomy      Past Surgical History:   Procedure Laterality Date    HX BREAST LUMPECTOMY Left 1/25/2022    LEFT BREAST ULTRASOUND GUIDED LUMPECTOMY AND LEFT BREAST SENTINEL NODE BIOPSY performed by Rhiannon Craft MD at [facility]    x2    [address] [place] [address] 2011    HX GI      colonoscopy    HX HEENT  1972    [place] Teeth extraction    HX POLYPECTOMY      ND BIOPSY OF BREAST, NEEDLE CORE Right 2011      Social History     Tobacco Use    Smoking status: Former     Packs/day: 0.50     Years: 7.00     Pack years: 3.50     Types: Cigarettes     Quit date:      Years since quittin.8    Smokeless tobacco: Never   Substance Use Topics    Alcohol use: Yes     Alcohol/week: 7.0 standard drinks     Types: 7 Glasses of wine per week     Comment: glass of wine nightly      Family History   Problem Relation Age of Onset    Heart Disease Mother     Atrial Fibrillation Mother     Cancer Mother         Liver CA    Hypertension Mother     Elevated Lipids Mother     Cancer Brother         Colon CA    Diabetes Father     Alcohol abuse Father     Anesth Problems Neg Hx      Current Outpatient Medications   Medication Sig    anastrozole (ARIMIDEX) 1 mg tablet Take 1 mg by mouth in the morning. Indications: hormone receptor positive breast cancer    venlafaxine-SR (EFFEXOR-XR) 37.5 mg capsule Take 37.5 mg by mouth daily. OTHER Bone Builder DS 3 tabs daily    OTHER Brain Support DS 1 daily    OTHER Phytonutrient Multi 2 tabs daily    glucosam/chond-msm1/C/gill/bor (GLUCOSAMINE-CHOND-MSM COMPLEX PO) 2 tabs daily    famotidine (PEPCID) 10 mg tablet Take 10 mg by mouth every other day. budesonide (RHINOCORT AQUA) 32 mcg/actuation nasal spray 2 Sprays by Right Nostril route daily. fexofenadine (ALLEGRA) 180 mg tablet Take  by mouth daily. anastrozole (ARIMIDEX) 1 mg tablet Take 1 mg by mouth daily. ondansetron (ZOFRAN ODT) 4 mg disintegrating tablet Take 1 Tablet by mouth every eight (8) hours as needed for Vomiting or Nausea or Vomiting. LORazepam (ATIVAN) 1 mg tablet Take 1 mg by mouth as needed for Anxiety. No current facility-administered medications for this visit.       Allergies   Allergen Reactions    Latex Rash    Sulfa (Sulfonamide Antibiotics) Rash A complete review of systems was obtained, negative except as described above and as reported on ROS sheet scanned into system. Physical Exam:     Visit Vitals  /72 (BP 1 Location: Left upper arm, BP Patient Position: Sitting)   Pulse 85   Temp 97.2 °F (36.2 °C) (Temporal)   Ht 5' 3\" (1.6 m)   Wt 151 lb 12.8 oz (68.9 kg)   SpO2 95%   BMI 26.89 kg/m²       ECOG PS: 0  General: No distress  Eyes:  anicteric sclerae  HENT: Atraumatic, wearing mask  Neck: Supple  Respiratory: CTAB, normal respiratory effort  CV: Normal rate, regular rhythm  GI: Soft, nontender, nondistended, no masses  MS: Normal gait and station. Digits without clubbing or cyanosis. Skin: No rashes, ecchymoses, or petechiae. Normal temperature, turgor, and texture. Psych: Alert, oriented, appropriate affect, normal judgment/insight  Neuro non focal  Breast  no masses appreciated b/l     Results:     Lab Results   Component Value Date/Time    WBC 7.6 01/20/2022 02:28 PM    HGB 13.9 01/20/2022 02:28 PM    HCT 41.4 01/20/2022 02:28 PM    PLATELET 727 80/04/5611 02:28 PM    MCV 95.4 01/20/2022 02:28 PM    ABS. NEUTROPHILS 3.5 01/20/2022 02:28 PM     No results found for: NA, K, CL, CO2, GLU, BUN, CREA, GFRAA, GFRNA, CA, NAPOC, KPOCT, CLPOC, GLUCPOC, IBUN, CREAPOC, ICAI  No results found for: TBILI, ALT, AP, TP, ALB, GLOB      Records reviewed and summarized above. Pathology report(s) reviewed above  Radiology report(s) reviewed above. Assessment:/PLAN     1)  Stage 2 [id number] LEFT breast cancer ER+ Her2 negative mammapring low risk luminal A post lumpectomy 1/22. Reviewed low risk mammaprint. Does not need chemo    Did breast radiation     Seen today for fu. On adjuvant hormonal therapy with anastrozole. Tolerating AI with manageable side effects of hot flashes/ arthralgia. Labs and routine HM with PCP. Mammo 11/22 with [facility]. Good per pt/ no record here. Clinically pt stable today and healthy overall. Moved to [place]. Fu with us in 3 months     2) hot flashes/ stopped ERT. On effexor via GYN. Increased dose helping some. Will fu with GYN/ [facility].     3) hx of panic attacks  Per PCP      4) Psychosocial. Mood good. Coping well. Has good support. SW support here as needed. Fu here in 4 months  Call if questions    I appreciate the opportunity to participate in Ms. Colleen Wiggins's care.     Signed By: Taniya Verduzcor, DO

## 2022-12-08 ENCOUNTER — OFFICE VISIT (OUTPATIENT)
Dept: SURGERY | Age: 67
End: 2022-12-08
Payer: COMMERCIAL

## 2022-12-08 VITALS — WEIGHT: 155 LBS | BODY MASS INDEX: 27.46 KG/M2

## 2022-12-08 DIAGNOSIS — Z98.890 S/P LUMPECTOMY OF BREAST: ICD-10-CM

## 2022-12-08 DIAGNOSIS — Z92.3 S/P RADIATION THERAPY: ICD-10-CM

## 2022-12-08 DIAGNOSIS — Z17.0 MALIGNANT NEOPLASM OF UPPER-OUTER QUADRANT OF LEFT BREAST IN FEMALE, ESTROGEN RECEPTOR POSITIVE (HCC): Primary | ICD-10-CM

## 2022-12-08 DIAGNOSIS — C50.412 MALIGNANT NEOPLASM OF UPPER-OUTER QUADRANT OF LEFT BREAST IN FEMALE, ESTROGEN RECEPTOR POSITIVE (HCC): Primary | ICD-10-CM

## 2022-12-08 DIAGNOSIS — Z85.3 HISTORY OF BREAST CANCER IN FEMALE: ICD-10-CM

## 2022-12-08 PROCEDURE — 1123F ACP DISCUSS/DSCN MKR DOCD: CPT | Performed by: NURSE PRACTITIONER

## 2022-12-08 PROCEDURE — 99213 OFFICE O/P EST LOW 20 MIN: CPT | Performed by: NURSE PRACTITIONER

## 2022-12-08 NOTE — PROGRESS NOTES
HISTORY OF PRESENT ILLNESS  Fernand Bence is a 75 y.o. female. HPI Established patient presents for follow-up for LEFT breast cancer. Denies breast mass, skin changes, nipple discharge and pain. Breast history -   Referring - Dr. Samir Jordan - [facility]  2012- History of right breast biopsy, benign  Mammogram, 12/27/21, BIRADS 4C, 2.5 cm irregular mass with indistinct margins at 1:00  12/28/21- US guided biopsy of LEFT breast, LEFT IDC, grd 2, ER %, ID %, HER2 equivocal, FISH negative, ki-67 20%  1/25/22 - LEFT breast lumpectomy and SLNB - Dr. Arabella Pang  2.5cm IDC, 1/4 nodes positive - T2N1mi  Low risk Mammaprint  5/19/22 - completed XRT - Dr. Brandon Montiel   5/2022 - start anastrozole - Dr. Lucy Vanessa       Family history -   Brother - colon cancer at 70      OB History    No obstetric history on file. Obstetric Comments   Menarche 14, LMP 53, # of children 2, age of 4st delivery 31, Hysterectomy/oophorectomy No/No, Breast bx Yes, history of breast feeding Yes, BCP Yes, Hormone therapy Yes                 Past Surgical History:   Procedure Laterality Date    HX BREAST LUMPECTOMY Left 1/25/2022    LEFT BREAST ULTRASOUND GUIDED LUMPECTOMY AND LEFT BREAST SENTINEL NODE BIOPSY performed by Forde Osgood, MD at [address] [address]    [address] [place], 1984    x2    HX DILATION AND CURETTAGE  2011    HX GI      colonoscopy    HX HEENT  1972    [place] Teeth extraction    HX POLYPECTOMY      ID BIOPSY OF BREAST, NEEDLE CORE Right 2011         ROS    Physical Exam  Constitutional:       Appearance: Normal appearance. Chest:   Breasts:     Right: No mass, nipple discharge, skin change or tenderness. Left: No mass, nipple discharge, skin change or tenderness. Comments: Stable post treatment changes to LEFT breast.  Musculoskeletal:      Comments: FROM - UE x 2   Lymphadenopathy:      Upper Body:      Right upper body: No supraclavicular or axillary adenopathy.       Left upper body: No supraclavicular or axillary adenopathy. Neurological:      Mental Status: She is alert. Psychiatric:         Attention and Perception: Attention normal.         Mood and Affect: Mood normal.         Speech: Speech normal.         Behavior: Behavior normal.       Visit Vitals  Wt 155 lb (70.3 kg)   BMI 27.46 kg/m²         ASSESSMENT and PLAN    ICD-10-CM ICD-9-CM    1. Malignant neoplasm of upper-outer quadrant of left breast in female, estrogen receptor positive (HCC)  C50.412 174.4     Z17.0 V86.0       2. S/P lumpectomy of breast  Z98.890 V45.89       3. S/P radiation therapy  Z92.3 V66.1       4. History of breast cancer in female  Z85.3 V10.3           Normal exam with no evidence of local recurrence. BDmammogram 3D in 11/2022. Reports normal at [facility]. Taking AI and has some joint pain in her hands. Will monitor and has follow-up with Dr. Jonah Rucker. RTC in 6 months or sooner PRN. She is comfortable with this plan. All questions answered and she stated understanding. Total time spent for this patient - 20 minutes.

## 2023-05-02 ENCOUNTER — TELEPHONE (OUTPATIENT)
Dept: ONCOLOGY | Age: 68
End: 2023-05-02

## 2023-05-03 ENCOUNTER — TELEPHONE (OUTPATIENT)
Dept: ONCOLOGY | Age: 68
End: 2023-05-03

## 2023-05-04 ENCOUNTER — OFFICE VISIT (OUTPATIENT)
Dept: SURGERY | Age: 68
End: 2023-05-04
Payer: COMMERCIAL

## 2023-05-04 VITALS — BODY MASS INDEX: 27.46 KG/M2 | HEIGHT: 63 IN | WEIGHT: 155 LBS

## 2023-05-04 DIAGNOSIS — Z85.3 HISTORY OF BREAST CANCER IN FEMALE: ICD-10-CM

## 2023-05-04 DIAGNOSIS — C50.412 MALIGNANT NEOPLASM OF UPPER-OUTER QUADRANT OF LEFT BREAST IN FEMALE, ESTROGEN RECEPTOR POSITIVE (HCC): Primary | ICD-10-CM

## 2023-05-04 DIAGNOSIS — Z17.0 MALIGNANT NEOPLASM OF UPPER-OUTER QUADRANT OF LEFT BREAST IN FEMALE, ESTROGEN RECEPTOR POSITIVE (HCC): Primary | ICD-10-CM

## 2023-05-04 DIAGNOSIS — Z98.890 S/P LUMPECTOMY OF BREAST: ICD-10-CM

## 2023-05-04 DIAGNOSIS — Z92.3 S/P RADIATION THERAPY: ICD-10-CM

## 2023-05-04 PROCEDURE — 99213 OFFICE O/P EST LOW 20 MIN: CPT | Performed by: NURSE PRACTITIONER

## 2023-05-04 PROCEDURE — 1123F ACP DISCUSS/DSCN MKR DOCD: CPT | Performed by: NURSE PRACTITIONER

## 2023-07-31 ENCOUNTER — TELEPHONE (OUTPATIENT)
Age: 68
End: 2023-07-31

## 2023-07-31 DIAGNOSIS — C50.912 MALIGNANT NEOPLASM OF LEFT BREAST IN FEMALE, ESTROGEN RECEPTOR POSITIVE, UNSPECIFIED SITE OF BREAST (HCC): Primary | ICD-10-CM

## 2023-07-31 DIAGNOSIS — Z17.0 MALIGNANT NEOPLASM OF LEFT BREAST IN FEMALE, ESTROGEN RECEPTOR POSITIVE, UNSPECIFIED SITE OF BREAST (HCC): Primary | ICD-10-CM

## 2023-07-31 RX ORDER — ANASTROZOLE 1 MG/1
1 TABLET ORAL DAILY
Qty: 30 TABLET | Refills: 5 | Status: SHIPPED | OUTPATIENT
Start: 2023-07-31

## 2023-07-31 NOTE — TELEPHONE ENCOUNTER
Patient called left  stating she need a refill on anastrozole. She need it sent to [facility] on [address] [place] [address].  Store number [phone]

## 2023-07-31 NOTE — TELEPHONE ENCOUNTER
Oral hormone therapy     Thu Carpenter is a  75 y. o.female  diagnosed with breast cancer . Ms. Hawthorne is being treated with anastrazole. Medication name: anastrazole    Dose:  1 mg   Frequency: daily    Ordering provider: Jerilyn Pereyra DO  Start date: 6/2022       Last OV 11/8/22  Next OV 8/22/23    Refill sent to pharmacy on file.         Yanna Montes, PHARMD, BCOP, BCPS    For Pharmacy Admin Tracking Only    Program: Medical Group  CPA in place:  Yes  Recommendation Provided To: Patient/Caregiver: 1 via Telephone  Intervention Detail: Refill(s) Provided  Intervention Accepted By: Patient/Caregiver: 1    Time Spent (min): 10

## 2023-08-21 NOTE — PROGRESS NOTES
[facility] [place] at [address] , [address]  W: [phone]  F: [phone]    Reason for Visit:   Rigo Hunter is a 75 y.o. female who is seen for fu breast cancer    Treatment History:     Breast biopsy 12/28/21   Lumpectomy 1/25/22   Radiation done 5/22   started AI 6/22. STAGE: PATHOLOGIC STAGE CLASSIFICATION (pTNM, AJCC 8th Edition)       TNM Descriptors: m (multiple foci of invasive carcinoma)       Primary Tumor (pT): pT2       Regional Lymph  Nodes Modifier: (sn): [place] node(s) evaluated       Regional Lymph Nodes (pN): pN1mi    SPECIAL STUDIES       Breast Biomarker Testing Performed on Previous Biopsy: Estrogen   Receptor (ER), Progesterone Receptor (PgR), HER2  (by   immunohistochemistry), HER2 (by in situ hybridization), Ki-67           Estrogen Receptor (ER) Status: Positive           Progesterone Receptor (PgR) Status: Positive           HER2 (by immunohistochemistry): Equivocal  (Score 2+)           HER2 (by in situ hybridization): Negative (not amplified)           Ki-67 Percentage of Positive Nuclei: 10%       Her2 FISH negative. History of Present Illness:     Pt seen today for office fu breast cancer. Doing well overall. Lives in [place]. Tolerating AI fine with hot flashes and arthralgia.   No fevers/ chills/ chest pain/ SOB/ nausea/ vomiting/diarrhea/ pain/fatigue      Past Medical History:   Diagnosis Date    Cancer ([address]) 12/2021    Left Breast CA    GERD (gastroesophageal reflux disease)     Nausea & vomiting     Psychiatric disorder     h/o panic attacks    Thyroid disease     Partial Thyroidectomy      Past Surgical History:   Procedure Laterality Date    BIOPSY OF BREAST, NEEDLE CORE Right 2011    BREAST LUMPECTOMY Left 1/25/2022    LEFT BREAST ULTRASOUND GUIDED LUMPECTOMY AND LEFT BREAST SENTINEL NODE BIOPSY performed by Radha Auguste MD at [facility], 1984    x2    DILATION

## 2023-08-22 ENCOUNTER — OFFICE VISIT (OUTPATIENT)
Age: 68
End: 2023-08-22
Payer: MEDICARE

## 2023-08-22 VITALS
BODY MASS INDEX: 28.52 KG/M2 | OXYGEN SATURATION: 96 % | TEMPERATURE: 99.1 F | WEIGHT: 161 LBS | DIASTOLIC BLOOD PRESSURE: 76 MMHG | RESPIRATION RATE: 18 BRPM | HEART RATE: 78 BPM | SYSTOLIC BLOOD PRESSURE: 118 MMHG

## 2023-08-22 DIAGNOSIS — Z98.890 H/O LUMPECTOMY: ICD-10-CM

## 2023-08-22 DIAGNOSIS — Z17.0 ESTROGEN RECEPTOR POSITIVE STATUS (ER+): ICD-10-CM

## 2023-08-22 DIAGNOSIS — C50.912 MALIGNANT NEOPLASM OF LEFT BREAST IN FEMALE, ESTROGEN RECEPTOR POSITIVE, UNSPECIFIED SITE OF BREAST (HCC): Primary | ICD-10-CM

## 2023-08-22 DIAGNOSIS — Z17.0 MALIGNANT NEOPLASM OF LEFT BREAST IN FEMALE, ESTROGEN RECEPTOR POSITIVE, UNSPECIFIED SITE OF BREAST (HCC): Primary | ICD-10-CM

## 2023-08-22 DIAGNOSIS — Z79.811 AROMATASE INHIBITOR USE: ICD-10-CM

## 2023-08-22 PROCEDURE — G8400 PT W/DXA NO RESULTS DOC: HCPCS | Performed by: INTERNAL MEDICINE

## 2023-08-22 PROCEDURE — 99213 OFFICE O/P EST LOW 20 MIN: CPT | Performed by: INTERNAL MEDICINE

## 2023-08-22 PROCEDURE — 4004F PT TOBACCO SCREEN RCVD TLK: CPT | Performed by: INTERNAL MEDICINE

## 2023-08-22 PROCEDURE — G8427 DOCREV CUR MEDS BY ELIG CLIN: HCPCS | Performed by: INTERNAL MEDICINE

## 2023-08-22 PROCEDURE — 1123F ACP DISCUSS/DSCN MKR DOCD: CPT | Performed by: INTERNAL MEDICINE

## 2023-08-22 PROCEDURE — 1090F PRES/ABSN URINE INCON ASSESS: CPT | Performed by: INTERNAL MEDICINE

## 2023-08-22 PROCEDURE — 3017F COLORECTAL CA SCREEN DOC REV: CPT | Performed by: INTERNAL MEDICINE

## 2023-08-22 PROCEDURE — G8419 CALC BMI OUT NRM PARAM NOF/U: HCPCS | Performed by: INTERNAL MEDICINE

## 2023-08-22 RX ORDER — VIT C/B6/B5/MAGNESIUM/HERB 173 50-5-6-5MG
CAPSULE ORAL DAILY
COMMUNITY

## 2023-08-22 NOTE — PROGRESS NOTES
Ginette Yousif is a 75 y.o. female    Chief Complaint   Patient presents with    Follow-up     breast cancer          1. Have you been to the ER, urgent care clinic since your last visit? Hospitalized since your last visit? No    2. Have you seen or consulted any other health care providers outside of the [address] [place] [address] since your last visit? Include any pap smears or colon screening.  Yes, -GYN, [facility] Dr. Ish Gibbons

## 2023-12-06 ENCOUNTER — TELEPHONE (OUTPATIENT)
Age: 68
End: 2023-12-06

## 2023-12-06 NOTE — TELEPHONE ENCOUNTER
Pt  would like to know if she can go over 3 months past scheduled time to come back to see provider. Call back number is [phone].

## 2023-12-06 NOTE — TELEPHONE ENCOUNTER
FU call to patient, ID verified X 2. States has moved to [facility] and is in [place] twice yearly to see Providers. Would like to move 08/2024 appt to 11/2024. Understands OK per Provider. States will call later to reschedule. Wished patient Happy Birthday from office. Update to Provider.

## 2023-12-07 ENCOUNTER — TELEPHONE (OUTPATIENT)
Age: 68
End: 2023-12-07

## 2023-12-13 DIAGNOSIS — C50.912 MALIGNANT NEOPLASM OF LEFT BREAST IN FEMALE, ESTROGEN RECEPTOR POSITIVE, UNSPECIFIED SITE OF BREAST (HCC): ICD-10-CM

## 2023-12-13 DIAGNOSIS — Z17.0 MALIGNANT NEOPLASM OF LEFT BREAST IN FEMALE, ESTROGEN RECEPTOR POSITIVE, UNSPECIFIED SITE OF BREAST (HCC): ICD-10-CM

## 2023-12-13 RX ORDER — ANASTROZOLE 1 MG/1
1 TABLET ORAL DAILY
Qty: 30 TABLET | Refills: 11 | Status: SHIPPED | OUTPATIENT
Start: 2023-12-13

## 2023-12-13 NOTE — TELEPHONE ENCOUNTER
Oral hormone therapy      Timo Curran is a  70 y. o.female  diagnosed with breast cancer . Ms. Hawthorne is being treated with anastrazole. Medication name: anastrazole     Dose:  1 mg   Frequency: daily     Ordering provider: Madeline Payton DO  Start date: 6/2022        Last OV 8/22/23  Next OV 11/14/24     Refill sent to pharmacy on file.          CALLIE TovarD, BCOP, BCPS    For Pharmacy Admin Tracking Only    Program: Medical Group  CPA in place:  Yes  Recommendation Provided To: Patient/Caregiver: 1 via Telephone  Intervention Detail: Refill(s) Provided  Intervention Accepted By: Patient/Caregiver: 1    Time Spent (min): 10

## 2023-12-20 ENCOUNTER — TELEPHONE (OUTPATIENT)
Age: 68
End: 2023-12-20

## 2023-12-20 NOTE — TELEPHONE ENCOUNTER
Received a request from Atilio Galeana from [facility] for medical records from 01/01/22 to 12/4/2023. Faxed ov, op notes, imaging and surgical pathology and labs. Faxed to [phone]

## 2024-05-14 ENCOUNTER — OFFICE VISIT (OUTPATIENT)
Age: 69
End: 2024-05-14
Payer: MEDICARE

## 2024-05-14 VITALS — HEIGHT: 63 IN | WEIGHT: 161 LBS | BODY MASS INDEX: 28.53 KG/M2

## 2024-05-14 DIAGNOSIS — Z92.3 S/P RADIATION THERAPY: ICD-10-CM

## 2024-05-14 DIAGNOSIS — Z17.0 MALIGNANT NEOPLASM OF UPPER-OUTER QUADRANT OF LEFT BREAST IN FEMALE, ESTROGEN RECEPTOR POSITIVE (HCC): Primary | ICD-10-CM

## 2024-05-14 DIAGNOSIS — Z98.890 S/P LUMPECTOMY OF BREAST: ICD-10-CM

## 2024-05-14 DIAGNOSIS — Z85.3 HISTORY OF BREAST CANCER IN FEMALE: ICD-10-CM

## 2024-05-14 DIAGNOSIS — C50.412 MALIGNANT NEOPLASM OF UPPER-OUTER QUADRANT OF LEFT BREAST IN FEMALE, ESTROGEN RECEPTOR POSITIVE (HCC): Primary | ICD-10-CM

## 2024-05-14 PROCEDURE — G8419 CALC BMI OUT NRM PARAM NOF/U: HCPCS | Performed by: NURSE PRACTITIONER

## 2024-05-14 PROCEDURE — 1090F PRES/ABSN URINE INCON ASSESS: CPT | Performed by: NURSE PRACTITIONER

## 2024-05-14 PROCEDURE — 1123F ACP DISCUSS/DSCN MKR DOCD: CPT | Performed by: NURSE PRACTITIONER

## 2024-05-14 PROCEDURE — 3017F COLORECTAL CA SCREEN DOC REV: CPT | Performed by: NURSE PRACTITIONER

## 2024-05-14 PROCEDURE — 1036F TOBACCO NON-USER: CPT | Performed by: NURSE PRACTITIONER

## 2024-05-14 PROCEDURE — 99213 OFFICE O/P EST LOW 20 MIN: CPT | Performed by: NURSE PRACTITIONER

## 2024-05-14 PROCEDURE — G8427 DOCREV CUR MEDS BY ELIG CLIN: HCPCS | Performed by: NURSE PRACTITIONER

## 2024-05-14 PROCEDURE — G8399 PT W/DXA RESULTS DOCUMENT: HCPCS | Performed by: NURSE PRACTITIONER

## 2024-05-14 NOTE — PROGRESS NOTES
HISTORY OF PRESENT ILLNESS  Akosua Hawthorne is a 68 y.o. female     HPI  Established patient presents for follow-up for LEFT breast cancer. Denies breast mass, skin changes, nipple discharge and pain.             Breast history -    Referring - Dr. Rucker - [facility]   - History of right breast biopsy, benign   Mammogram, 21, BIRADS 4C, 2.5 cm irregular mass with indistinct margins at 1:00   21- US guided biopsy of LEFT breast, LEFT IDC, grd 2, [place] %, [place] %, HER2 equivocal, FISH negative, ki-67 20%   22 - LEFT breast lumpectomy and SLNB - Dr. Posada   2.5cm IDC, 1/ nodes positive - T2N1mi   Low risk Mammaprint   22 - completed XRT - Dr. Burgess    2022 - start anastrozole - Dr. Fernandez           Family history -    Brother - colon cancer at 71        OB History    No obstetric history on file.      Obstetric Comments   Menarche 11, LMP 54, # of children 2, age of 1st delivery 25, Hysterectomy/oophorectomy No/No, Breast bx Yes, history of breast feeding  Yes, BCP Yes, Hormone therapy Yes                   Past Surgical History:   Procedure Laterality Date    BIOPSY OF BREAST, NEEDLE CORE Right     BREAST LUMPECTOMY Left 2022    LEFT BREAST ULTRASOUND GUIDED LUMPECTOMY AND LEFT BREAST SENTINEL NODE BIOPSY performed by Lia Posada MD at [facility] AMBULATORY OR     SECTION  , 1983    x2    DILATION AND CURETTAGE OF UTERUS      GI      colonoscopy    HEENT      [place]eth extraction    POLYPECTOMY               Review of Systems      Physical Exam  Constitutional:       Appearance: Normal appearance.   Chest:   Breasts:     Right: No mass, nipple discharge, skin change or tenderness.      Left: No mass, nipple discharge, skin change (well healed incisions to LEFT breast and axilla) or tenderness.      Comments: Yeast along inframammary folds BILATERALLY  Musculoskeletal:      Comments: FROM - UE x 2   Lymphadenopathy:      Upper Body:      Right upper body:

## 2024-06-17 ENCOUNTER — TELEPHONE (OUTPATIENT)
Age: 69
End: 2024-06-17

## 2024-06-18 NOTE — TELEPHONE ENCOUNTER
FU call to patient, ID verified X 2. [phone].  Patient states has been experiencing hot flashes, increasing joint pain believed to be from AI.  Pain has become so severe that her PCP has had her start PT for bilateral hip pain.  States recent DEXA scan shows increased osteopenia.  Advised patient that per NP, to hold AI X 2 weeks to see if pain improves.  Patient agrees and will contact RN on 7/2/24.  Update to Provider.

## 2024-06-24 ENCOUNTER — TELEPHONE (OUTPATIENT)
Age: 69
End: 2024-06-24

## 2024-06-24 NOTE — TELEPHONE ENCOUNTER
Patient called stated she would like to speak again regarding new diagnosis. She has 3 fractured vertebrae in lower back that she found out about on Friday, also breaking out in hives and stomach issues and not sure how much of this is caused by Anastrozole.

## 2024-06-25 NOTE — TELEPHONE ENCOUNTER
Pt called requesting to speak with nurse regarding withdrawal from anastrazole. Call back number is [phone]

## 2024-06-25 NOTE — TELEPHONE ENCOUNTER
FU call to patient, ID verified X 2, [phone].  Patient states multiple issues occurring during AI hold, travel.  Has been holding AI since 6/17/24, but during travel developed bloating in abdomen, constipation, cotton mouth, a hive type rash, increased hot flashes/nightsweats, greatly increased back pain that lead to ER visit, altered sleep patterns.    Does endorse that holding AI has led to significant reduction in joint stiffness. Had small BM today. Seeing PCP 6/26/24 for post ER FU.  Discussed with Provider, recommend continued hold of AI for the full 2 weeks.  Explained that many of issues she is experiencing may be related to travel instead of AI. Plans to discuss 3 compression fractures in back with PCP tomorrow and will update RN with PCP findings.  Update to Provider.

## 2024-06-26 ENCOUNTER — TELEPHONE (OUTPATIENT)
Age: 69
End: 2024-06-26

## 2024-06-26 NOTE — TELEPHONE ENCOUNTER
Patient called and requested a call back.   She asked if I could send her office notes to her new provider in [place].  She provided the fax number [phone], Dr. Willow Lopez.   Sent records per patient request. Fax confirmation received.

## 2024-08-26 ENCOUNTER — CLINICAL DOCUMENTATION (OUTPATIENT)
Age: 69
End: 2024-08-26

## 2024-08-26 NOTE — PROGRESS NOTES
Received letter from patient. She stopped Anastrozole in mid-June due to side effects: aches, pains, fractured vertebrae, elevated LFTs, abnormal EKG and osteopenia. She states that she feels 100% better since being off Anastrozole. She is asking about taking every other day if were tor resume.    Please advise patient to stay off Anastrozole. Options include switching to another aromatase inhibitor such as Letrozole or Aromasin or trying Tamoxifen. Can schedule sooner OV if she wants.

## 2024-08-27 ENCOUNTER — TELEPHONE (OUTPATIENT)
Age: 69
End: 2024-08-27

## 2024-08-27 NOTE — TELEPHONE ENCOUNTER
Pt called requesting nurse call back about letter dropped off last Thursday 8/29.      # [phone]

## 2024-08-27 NOTE — TELEPHONE ENCOUNTER
Returned patient call, ID verified X 2. [phone].  Confirmed for patient that letter dropped off at office had been reviewed by Provider.  Updated that Provider recommends staying off of Anastrozole.    Reviewed that there were several other medications that could be ordered: Letrozole, Aromasin, and Tamoxifen.  Patient verbalized familiarity with names, and understands can schedule sooner than 11/2024 OV for further info/prescribing.  Understands that because she lives in [place] that she cannot do a virtual visit.   She will review her calendar and contact  for earlier appt on 8/28/24.    Also states that her Provider in [place] would like for her to start on some Fosamax or other for osteoporosis.  Understands that Provider would have no objection to her taking medication for bone health.   Update to Provider.

## 2024-09-24 ENCOUNTER — TELEPHONE (OUTPATIENT)
Age: 69
End: 2024-09-24

## 2024-09-25 ENCOUNTER — TELEPHONE (OUTPATIENT)
Age: 69
End: 2024-09-25

## 2024-09-26 ENCOUNTER — TELEPHONE (OUTPATIENT)
Age: 69
End: 2024-09-26

## 2024-09-26 ENCOUNTER — OFFICE VISIT (OUTPATIENT)
Age: 69
End: 2024-09-26
Payer: MEDICARE

## 2024-09-26 VITALS
OXYGEN SATURATION: 95 % | WEIGHT: 146 LBS | RESPIRATION RATE: 20 BRPM | SYSTOLIC BLOOD PRESSURE: 100 MMHG | BODY MASS INDEX: 25.86 KG/M2 | HEART RATE: 69 BPM | DIASTOLIC BLOOD PRESSURE: 68 MMHG | TEMPERATURE: 98.5 F

## 2024-09-26 DIAGNOSIS — C50.912 MALIGNANT NEOPLASM OF LEFT BREAST IN FEMALE, ESTROGEN RECEPTOR POSITIVE, UNSPECIFIED SITE OF BREAST (HCC): Primary | ICD-10-CM

## 2024-09-26 DIAGNOSIS — Z17.0 MALIGNANT NEOPLASM OF LEFT BREAST IN FEMALE, ESTROGEN RECEPTOR POSITIVE, UNSPECIFIED SITE OF BREAST (HCC): Primary | ICD-10-CM

## 2024-09-26 PROCEDURE — 1090F PRES/ABSN URINE INCON ASSESS: CPT | Performed by: NURSE PRACTITIONER

## 2024-09-26 PROCEDURE — 1036F TOBACCO NON-USER: CPT | Performed by: NURSE PRACTITIONER

## 2024-09-26 PROCEDURE — G8419 CALC BMI OUT NRM PARAM NOF/U: HCPCS | Performed by: NURSE PRACTITIONER

## 2024-09-26 PROCEDURE — 99213 OFFICE O/P EST LOW 20 MIN: CPT | Performed by: NURSE PRACTITIONER

## 2024-09-26 PROCEDURE — 1123F ACP DISCUSS/DSCN MKR DOCD: CPT | Performed by: NURSE PRACTITIONER

## 2024-09-26 PROCEDURE — G8399 PT W/DXA RESULTS DOCUMENT: HCPCS | Performed by: NURSE PRACTITIONER

## 2024-09-26 PROCEDURE — G8428 CUR MEDS NOT DOCUMENT: HCPCS | Performed by: NURSE PRACTITIONER

## 2024-09-26 PROCEDURE — 3017F COLORECTAL CA SCREEN DOC REV: CPT | Performed by: NURSE PRACTITIONER

## 2024-09-26 RX ORDER — ROMOSOZUMAB-AQQG 105 MG/1.17ML
210 INJECTION, SOLUTION SUBCUTANEOUS ONCE
COMMUNITY

## 2024-09-26 RX ORDER — VENLAFAXINE HYDROCHLORIDE 75 MG/1
CAPSULE, EXTENDED RELEASE ORAL
COMMUNITY
Start: 2024-09-23

## 2024-09-26 RX ORDER — TAMOXIFEN CITRATE 20 MG/1
20 TABLET ORAL DAILY
Qty: 90 TABLET | Refills: 3 | Status: SHIPPED | OUTPATIENT
Start: 2024-09-26

## 2025-05-15 ENCOUNTER — OFFICE VISIT (OUTPATIENT)
Age: 70
End: 2025-05-15
Payer: MEDICARE

## 2025-05-15 VITALS — WEIGHT: 145 LBS | HEIGHT: 63 IN | BODY MASS INDEX: 25.69 KG/M2

## 2025-05-15 DIAGNOSIS — Z85.3 HISTORY OF BREAST CANCER IN FEMALE: ICD-10-CM

## 2025-05-15 DIAGNOSIS — Z98.890 S/P LUMPECTOMY OF BREAST: ICD-10-CM

## 2025-05-15 DIAGNOSIS — Z17.0 MALIGNANT NEOPLASM OF UPPER-OUTER QUADRANT OF LEFT BREAST IN FEMALE, ESTROGEN RECEPTOR POSITIVE (HCC): Primary | ICD-10-CM

## 2025-05-15 DIAGNOSIS — C50.412 MALIGNANT NEOPLASM OF UPPER-OUTER QUADRANT OF LEFT BREAST IN FEMALE, ESTROGEN RECEPTOR POSITIVE (HCC): Primary | ICD-10-CM

## 2025-05-15 DIAGNOSIS — Z92.3 S/P RADIATION THERAPY: ICD-10-CM

## 2025-05-15 PROCEDURE — 1036F TOBACCO NON-USER: CPT | Performed by: NURSE PRACTITIONER

## 2025-05-15 PROCEDURE — 99213 OFFICE O/P EST LOW 20 MIN: CPT | Performed by: NURSE PRACTITIONER

## 2025-05-15 PROCEDURE — 1159F MED LIST DOCD IN RCRD: CPT | Performed by: NURSE PRACTITIONER

## 2025-05-15 PROCEDURE — G8427 DOCREV CUR MEDS BY ELIG CLIN: HCPCS | Performed by: NURSE PRACTITIONER

## 2025-05-15 PROCEDURE — 1123F ACP DISCUSS/DSCN MKR DOCD: CPT | Performed by: NURSE PRACTITIONER

## 2025-05-15 PROCEDURE — 3017F COLORECTAL CA SCREEN DOC REV: CPT | Performed by: NURSE PRACTITIONER

## 2025-05-15 PROCEDURE — G8419 CALC BMI OUT NRM PARAM NOF/U: HCPCS | Performed by: NURSE PRACTITIONER

## 2025-05-15 PROCEDURE — 1090F PRES/ABSN URINE INCON ASSESS: CPT | Performed by: NURSE PRACTITIONER

## 2025-05-15 PROCEDURE — 1160F RVW MEDS BY RX/DR IN RCRD: CPT | Performed by: NURSE PRACTITIONER

## 2025-05-15 PROCEDURE — G8399 PT W/DXA RESULTS DOCUMENT: HCPCS | Performed by: NURSE PRACTITIONER

## 2025-05-15 NOTE — PROGRESS NOTES
HISTORY OF PRESENT ILLNESS  Akosua Hawthorne is a 69 y.o. female     HPI  Established patient presents for follow-up for LEFT breast cancer. Denies breast mass, skin changes, nipple discharge and pain.             Breast history -    Referring - Dr. Rucker - [facility]   - History of right breast biopsy, benign   Mammogram, 21, BIRADS 4C, 2.5 cm irregular mass with indistinct margins at 1:00   21- US guided biopsy of LEFT breast, LEFT IDC, grd 2, ER %, [place] %, HER2 equivocal, FISH negative, ki-67 20%   22 - LEFT breast lumpectomy and SLNB - Dr. Posada   2.5cm IDC,  nodes positive - T2N1mi   Low risk Mammaprint   22 - completed XRT - Dr. Burgess    2022 - start anastrozole - Dr. Fernandez  Was off for about two months due to side effects of hot flashes and joint pain  2024 - started tamoxifen - Dr. Fernandez           Family history -    Brother - colon cancer at 71      OB History    No obstetric history on file.      Obstetric Comments   Menarche 11, LMP 54, # of children 2, age of 1st delivery 25, Hysterectomy/oophorectomy No/No, Breast bx Yes, history of breast feeding  Yes, BCP Yes, Hormone therapy Yes                   Past Surgical History:   Procedure Laterality Date    BIOPSY OF BREAST, NEEDLE CORE Right     BREAST LUMPECTOMY Left 2022    LEFT BREAST ULTRASOUND GUIDED LUMPECTOMY AND LEFT BREAST SENTINEL NODE BIOPSY performed by Lia Posada MD at [facility] AMBULATORY OR     SECTION  , 1983    x2    DILATION AND CURETTAGE OF UTERUS      GI      colonoscopy    HEENT      [place] Teeth extraction    POLYPECTOMY           Review of Systems      Physical Exam  Constitutional:       Appearance: Normal appearance.   Chest:   Breasts:     Right: No mass, nipple discharge, skin change or tenderness.      Left: No mass, nipple discharge, skin change or tenderness.      Comments: Mild post treatment changes to the LEFT breast  Musculoskeletal:      Comments:

## 2025-06-14 DIAGNOSIS — C50.912 MALIGNANT NEOPLASM OF LEFT BREAST IN FEMALE, ESTROGEN RECEPTOR POSITIVE, UNSPECIFIED SITE OF BREAST (HCC): ICD-10-CM

## 2025-06-14 DIAGNOSIS — Z17.0 MALIGNANT NEOPLASM OF LEFT BREAST IN FEMALE, ESTROGEN RECEPTOR POSITIVE, UNSPECIFIED SITE OF BREAST (HCC): ICD-10-CM

## 2025-06-16 RX ORDER — TAMOXIFEN CITRATE 20 MG/1
20 TABLET ORAL DAILY
Qty: 90 TABLET | Refills: 3 | Status: SHIPPED | OUTPATIENT
Start: 2025-06-16

## (undated) DEVICE — INSULATED BLADE ELECTRODE: Brand: EDGE

## (undated) DEVICE — HYPODERMIC SAFETY NEEDLE: Brand: MONOJECT

## (undated) DEVICE — GLOVE SURG SZ 6 L12IN FNGR THK79MIL GRN LTX FREE

## (undated) DEVICE — SOLUTION IRRIG 1000ML 0.9% SOD CHL USP POUR PLAS BTL

## (undated) DEVICE — SUTURE MCRYL SZ 4-0 L27IN ABSRB UD L19MM PS-2 1/2 CIR PRIM Y426H

## (undated) DEVICE — GARMENT,MEDLINE,DVT,INT,CALF,MED, GEN2: Brand: MEDLINE

## (undated) DEVICE — VERAFORM ADAPTABLE RADIOPAQUE TISSUE MARKER

## (undated) DEVICE — SYR 10ML LUER LOK 1/5ML GRAD --

## (undated) DEVICE — SUT SLK 2-0SH 30IN BLK --

## (undated) DEVICE — TOWEL SURG W17XL27IN STD BLU COT NONFENESTRATED PREWASHED

## (undated) DEVICE — PREP SKN CHLRAPRP APL 26ML STR --

## (undated) DEVICE — DRAPE,CHEST,FENES,15X10,STERIL: Brand: MEDLINE

## (undated) DEVICE — 1010 S-DRAPE TOWEL DRAPE 10/BX: Brand: STERI-DRAPE™

## (undated) DEVICE — CHEST PACK: Brand: MEDLINE INDUSTRIES, INC.

## (undated) DEVICE — YANKAUER,TAPERED BULBOUS TIP,W/O VENT: Brand: MEDLINE

## (undated) DEVICE — PENCIL SMK EVAC L10FT DIA95MM TBNG NONSTICK W ADPT TO 22MM

## (undated) DEVICE — HANDLE LT SNAP ON ULT DURABLE LENS FOR TRUMPF ALC DISPOSABLE

## (undated) DEVICE — SPONGE LAP 18X18IN STRL -- 5/PK

## (undated) DEVICE — BASIN ST MAJOR-NO CAUTERY: Brand: MEDLINE INDUSTRIES, INC.

## (undated) DEVICE — SPONGE GZ W4XL4IN COT 12 PLY TYP VII WVN C FLD DSGN

## (undated) DEVICE — REM POLYHESIVE ADULT PATIENT RETURN ELECTRODE: Brand: VALLEYLAB

## (undated) DEVICE — COVER US PRB W12XL244CM FLD IORT STR TIP

## (undated) DEVICE — DRAPE,REIN 53X77,STERILE: Brand: MEDLINE

## (undated) DEVICE — PROBE DETECTION F/LUMPECTOMY -- ORDER IN MULTIPLES OF 5 EA ..MARGINPROBE

## (undated) DEVICE — SUTURE VCRL SZ 2-0 L27IN ABSRB UD L26MM SH 1/2 CIR J417H

## (undated) DEVICE — DERMABOND SKIN ADH 0.7ML -- DERMABOND ADVANCED 12/BX

## (undated) DEVICE — 3M™ TEGADERM™ TRANSPARENT FILM DRESSING FRAME STYLE, 1626W, 4 IN X 4-3/4 IN (10 CM X 12 CM), 50/CT 4CT/CASE: Brand: 3M™ TEGADERM™

## (undated) DEVICE — TUBING, SUCTION, 1/4" X 12', STRAIGHT: Brand: MEDLINE